# Patient Record
Sex: FEMALE | Race: WHITE | Employment: FULL TIME
[De-identification: names, ages, dates, MRNs, and addresses within clinical notes are randomized per-mention and may not be internally consistent; named-entity substitution may affect disease eponyms.]

---

## 2017-02-07 ENCOUNTER — HOSPITAL ENCOUNTER (OUTPATIENT)
Dept: MRI IMAGING | Facility: CLINIC | Age: 57
Discharge: HOME | End: 2017-02-07
Payer: COMMERCIAL

## 2017-02-07 DIAGNOSIS — M25.562 ACUTE PAIN OF LEFT KNEE: ICD-10-CM

## 2017-02-07 PROCEDURE — 73721 MRI JNT OF LWR EXTRE W/O DYE: CPT | Performed by: RADIOLOGY

## 2017-02-07 PROCEDURE — 73721 MRI JNT OF LWR EXTRE W/O DYE: CPT

## 2018-01-16 ENCOUNTER — HOSPITAL ENCOUNTER (EMERGENCY)
Facility: CLINIC | Age: 58
Discharge: HOME | End: 2018-01-16
Attending: EMERGENCY MEDICINE
Payer: COMMERCIAL

## 2018-01-16 ENCOUNTER — APPOINTMENT (OUTPATIENT)
Dept: GENERAL RADIOLOGY | Facility: CLINIC | Age: 58
End: 2018-01-16
Payer: COMMERCIAL

## 2018-01-16 VITALS
OXYGEN SATURATION: 100 % | WEIGHT: 165 LBS | SYSTOLIC BLOOD PRESSURE: 119 MMHG | BODY MASS INDEX: 22.35 KG/M2 | HEIGHT: 72 IN | TEMPERATURE: 98.7 F | DIASTOLIC BLOOD PRESSURE: 58 MMHG | RESPIRATION RATE: 18 BRPM | HEART RATE: 59 BPM

## 2018-01-16 DIAGNOSIS — J10.1 INFLUENZA B: Primary | ICD-10-CM

## 2018-01-16 LAB
DIRECT EXAM: NORMAL
Lab: NORMAL
SPECIMEN DESCRIPTION: NORMAL
STATUS: NORMAL

## 2018-01-16 PROCEDURE — 99284 EMERGENCY DEPT VISIT MOD MDM: CPT

## 2018-01-16 PROCEDURE — 87804 INFLUENZA ASSAY W/OPTIC: CPT

## 2018-01-16 PROCEDURE — 71046 X-RAY EXAM CHEST 2 VIEWS: CPT

## 2018-01-16 RX ORDER — PREDNISONE 10 MG/1
TABLET ORAL
Qty: 20 TABLET | Refills: 0 | Status: SHIPPED | OUTPATIENT
Start: 2018-01-16 | End: 2018-01-26

## 2018-01-16 RX ORDER — OSELTAMIVIR PHOSPHATE 75 MG/1
75 CAPSULE ORAL 2 TIMES DAILY
Qty: 10 CAPSULE | Refills: 0 | Status: SHIPPED | OUTPATIENT
Start: 2018-01-16 | End: 2018-01-21

## 2018-01-16 ASSESSMENT — PAIN DESCRIPTION - PROGRESSION: CLINICAL_PROGRESSION: NOT CHANGED

## 2018-01-16 ASSESSMENT — PAIN DESCRIPTION - ONSET: ONSET: ON-GOING

## 2018-01-16 ASSESSMENT — PAIN DESCRIPTION - FREQUENCY: FREQUENCY: CONTINUOUS

## 2018-01-16 ASSESSMENT — PAIN DESCRIPTION - PAIN TYPE: TYPE: ACUTE PAIN

## 2018-01-16 ASSESSMENT — PAIN SCALES - GENERAL: PAINLEVEL_OUTOF10: 7

## 2018-01-16 ASSESSMENT — PAIN DESCRIPTION - DESCRIPTORS: DESCRIPTORS: ACHING

## 2018-01-16 NOTE — ED PROVIDER NOTES
oxygen saturation is 100%. Physical Exam   Constitutional: She is oriented to person, place, and time. She appears well-developed and well-nourished. No distress. HENT:   Head: Normocephalic and atraumatic. Right Ear: External ear normal.   Left Ear: External ear normal.   Mouth/Throat: Oropharynx is clear and moist. No oropharyngeal exudate. Nasal membranes are swollen. Eyes: Conjunctivae and EOM are normal. Pupils are equal, round, and reactive to light. Neck: Normal range of motion. Neck supple. No tracheal deviation present. Cardiovascular: Normal rate, regular rhythm and intact distal pulses. Pulmonary/Chest: Effort normal and breath sounds normal. No respiratory distress. Abdominal: Soft. Bowel sounds are normal. She exhibits no distension. There is no tenderness. Musculoskeletal: Normal range of motion. She exhibits no edema or tenderness. Neurological: She is alert and oriented to person, place, and time. Skin: Skin is warm and dry. Psychiatric: She has a normal mood and affect. Her behavior is normal. Judgment and thought content normal.   Vitals reviewed. DIFFERENTIAL DIAGNOSIS/ MDM:   With a cough and fever I have ordered a chest x-ray to investigate for pneumonia. I'll also check this patient for influenza. DIAGNOSTIC RESULTS     EKG:  None    RADIOLOGY:   Xr Chest Standard (2 Vw)    Result Date: 1/16/2018  EXAMINATION: TWO VIEWS OF THE CHEST 1/16/2018 9:37 am COMPARISON: None. HISTORY: ORDERING SYSTEM PROVIDED HISTORY: cough TECHNOLOGIST PROVIDED HISTORY: Reason for exam:->cough Ordering Physician Provided Reason for Exam: cough Acuity: Acute Type of Exam: Initial FINDINGS: The heart is not enlarged. No pulmonary venous congestion or edema. Symmetric hyperexpansion of the lungs consistent with emphysema/ COPD. Apparent nodular densities overlying the 5th and 6 anterior ribs bilaterally reflect nipple shadows. No focal lung consolidation or infiltrate.  No pleural

## 2018-01-27 ENCOUNTER — APPOINTMENT (OUTPATIENT)
Dept: GENERAL RADIOLOGY | Facility: CLINIC | Age: 58
End: 2018-01-27
Payer: COMMERCIAL

## 2018-01-27 ENCOUNTER — HOSPITAL ENCOUNTER (EMERGENCY)
Facility: CLINIC | Age: 58
Discharge: HOME | End: 2018-01-27
Attending: EMERGENCY MEDICINE
Payer: COMMERCIAL

## 2018-01-27 VITALS
TEMPERATURE: 99.6 F | WEIGHT: 160 LBS | HEART RATE: 102 BPM | HEIGHT: 72 IN | DIASTOLIC BLOOD PRESSURE: 74 MMHG | BODY MASS INDEX: 21.67 KG/M2 | OXYGEN SATURATION: 93 % | RESPIRATION RATE: 16 BRPM | SYSTOLIC BLOOD PRESSURE: 131 MMHG

## 2018-01-27 DIAGNOSIS — J18.9 PNEUMONIA DUE TO ORGANISM: Primary | ICD-10-CM

## 2018-01-27 PROCEDURE — 99283 EMERGENCY DEPT VISIT LOW MDM: CPT

## 2018-01-27 PROCEDURE — 71046 X-RAY EXAM CHEST 2 VIEWS: CPT

## 2018-01-27 PROCEDURE — 96372 THER/PROPH/DIAG INJ SC/IM: CPT

## 2018-01-27 PROCEDURE — 6360000002 HC RX W HCPCS: Performed by: EMERGENCY MEDICINE

## 2018-01-27 RX ORDER — AZITHROMYCIN 250 MG/1
TABLET, FILM COATED ORAL
Qty: 6 TABLET | Refills: 0 | Status: ON HOLD | OUTPATIENT
Start: 2018-01-27 | End: 2018-02-04

## 2018-01-27 RX ORDER — CEFTRIAXONE 1 G/1
1 INJECTION, POWDER, FOR SOLUTION INTRAMUSCULAR; INTRAVENOUS ONCE
Status: COMPLETED | OUTPATIENT
Start: 2018-01-27 | End: 2018-01-27

## 2018-01-27 RX ORDER — AMOXICILLIN AND CLAVULANATE POTASSIUM 875; 125 MG/1; MG/1
1 TABLET, FILM COATED ORAL 2 TIMES DAILY
Qty: 28 TABLET | Refills: 0 | Status: SHIPPED | OUTPATIENT
Start: 2018-01-27 | End: 2018-01-27

## 2018-01-27 RX ORDER — ALBUTEROL SULFATE 2.5 MG/3ML
2.5 SOLUTION RESPIRATORY (INHALATION) ONCE
Status: COMPLETED | OUTPATIENT
Start: 2018-01-27 | End: 2018-01-27

## 2018-01-27 RX ORDER — ALBUTEROL SULFATE 90 UG/1
2 AEROSOL, METERED RESPIRATORY (INHALATION) EVERY 4 HOURS PRN
Qty: 1 INHALER | Refills: 0 | Status: SHIPPED | OUTPATIENT
Start: 2018-01-27 | End: 2019-09-15

## 2018-01-27 RX ORDER — BENZONATATE 100 MG/1
100 CAPSULE ORAL 3 TIMES DAILY PRN
Qty: 30 CAPSULE | Refills: 0 | Status: ON HOLD | OUTPATIENT
Start: 2018-01-27 | End: 2018-02-04

## 2018-01-27 RX ORDER — ALBUTEROL SULFATE 90 UG/1
2 AEROSOL, METERED RESPIRATORY (INHALATION) EVERY 6 HOURS PRN
Status: DISCONTINUED | OUTPATIENT
Start: 2018-01-27 | End: 2018-01-27

## 2018-01-27 RX ADMIN — CEFTRIAXONE SODIUM 1 G: 1 INJECTION, POWDER, FOR SOLUTION INTRAMUSCULAR; INTRAVENOUS at 16:55

## 2018-01-27 RX ADMIN — ALBUTEROL SULFATE 2.5 MG: 2.5 SOLUTION RESPIRATORY (INHALATION) at 16:24

## 2018-01-27 ASSESSMENT — PAIN DESCRIPTION - LOCATION: LOCATION: HEAD

## 2018-01-27 ASSESSMENT — PAIN DESCRIPTION - PAIN TYPE: TYPE: ACUTE PAIN

## 2018-01-27 ASSESSMENT — PAIN SCALES - GENERAL: PAINLEVEL_OUTOF10: 5

## 2018-01-27 NOTE — ED PROVIDER NOTES
history is not on file. SOCIAL HISTORY      reports that she has been smoking Cigarettes. She has been smoking about 1.00 pack per day. She has never used smokeless tobacco. She reports that she does not drink alcohol or use drugs. PHYSICAL EXAM     INITIAL VITALS:  height is 6' 3\" (1.905 m) and weight is 72.6 kg (160 lb). Her oral temperature is 99.6 °F (37.6 °C). Her blood pressure is 131/74 and her pulse is 102. Her respiration is 16 and oxygen saturation is 93%. Patient is alert and oriented, in no apparent distress. HEENT is atraumatic. Pupils are PERRL at 4 mm. Mucous membranes moist.  Clear nasal rhinorrhea. TMs negative bilaterally. Posterior pharynx and normal.   Neck is supple with no lymphadenopathy. No JVD. No meningismus. Heart sounds regular rate and rhythm with no gallops, murmurs, or rubs. Lungs:  Scant rhonchi. Moist cough. Abdomen: soft, nontender with no pain to palpation. Normal bowel sounds are noted. No rebound or guarding. Musculoskeletal exam shows no evidence of trauma. Skin: no rash or edema. Neurological exam reveals cranial nerves 2 through 12 grossly intact. Patient has equal  and normal deep tendon reflexes. Psychiatric: no hallucinations or suicidal ideation. Lymphatics.:  No lymphadenopathy.        DIFFERENTIAL DIAGNOSIS/ MDM:     Pneumonia, influenza, URI, COPD    DIAGNOSTIC RESULTS       RADIOLOGY:   I directly visualized the following  images and reviewed the radiologist interpretations:  XR CHEST STANDARD (2 VW)   Final Result   New extensive consolidation in the right lower lobe suggesting pneumonia           XR CHEST STANDARD (2 VW) (Final result)   Result time 01/27/18 16:38:09   Final result by Areli Dee MD (01/27/18 16:38:09)                Impression:    New extensive consolidation in the right lower lobe suggesting pneumonia            Narrative:    EXAMINATION:  TWO VIEWS OF THE CHEST    1/27/2018 4:27 pm    COMPARISON:  01/16/2018    HISTORY:  ORDERING SYSTEM PROVIDED HISTORY: cough  TECHNOLOGIST PROVIDED HISTORY:  Reason for exam:->cough  Ordering Physician Provided Reason for Exam: Pt. States she had the flu 2  weeks ago and was feeling better but now symptoms have returned.  C/o  headache, dizziness, cough, weakness with nausea and vomiting. Acuity: Acute  Type of Exam: Initial    FINDINGS:  New airspace opacification seen in the right lower lobe.  The heart size is  stable.  No pneumothorax.  No pleural effusion.                    EMERGENCY DEPARTMENT COURSE:   Vitals:    Vitals:    01/27/18 1610   BP: 131/74   Pulse: 102   Resp: 16   Temp: 99.6 °F (37.6 °C)   TempSrc: Oral   SpO2: 93%   Weight: 72.6 kg (160 lb)   Height: 6' 3\" (1.905 m)     -------------------------  BP: 131/74, Temp: 99.6 °F (37.6 °C), Pulse: 102, Resp: 16      Re-evaluation Notes    The patient received a breathing treatment and I gave her Rocephin while here. I have written for Zithromax and she should follow-up with her doctor. She was also written for albuterol. The patient is discharged in good condition. FINAL IMPRESSION      1. Pneumonia due to organism          DISPOSITION/PLAN   DISPOSITION Discharge - Pending Orders Complete 01/27/2018 04:45:51 PM      Condition on Disposition    good    PATIENT REFERRED TO:  Raymundo Grove MD  [address] [place] [address]  [phone]    In 1 week        DISCHARGE MEDICATIONS:  New Prescriptions    ALBUTEROL SULFATE HFA (PROVENTIL HFA) 108 (90 BASE) MCG/ACT INHALER    Inhale 2 puffs into the lungs every 4 hours as needed for Wheezing or Shortness of Breath (Space out to every 6 hours as symptoms improve) Space out to every 6 hours as symptoms improve. AZITHROMYCIN (ZITHROMAX) 250 MG TABLET    Take 2 tablets the first day, then one tablet per day, until gone.     BENZONATATE (TESSALON PERLES) 100 MG CAPSULE    Take 1 capsule by mouth 3 times daily as

## 2018-01-27 NOTE — ED NOTES
Discharge instructions reviewed, prescriptions given, and follow up information given.      Lo Gonzales RN  01/27/18 4626

## 2018-01-31 ENCOUNTER — APPOINTMENT (OUTPATIENT)
Dept: CT IMAGING | Age: 58
DRG: 194 | End: 2018-01-31
Attending: FAMILY MEDICINE
Payer: COMMERCIAL

## 2018-01-31 ENCOUNTER — HOSPITAL ENCOUNTER (INPATIENT)
Age: 58
LOS: 4 days | Discharge: HOME | DRG: 194 | End: 2018-02-04
Attending: FAMILY MEDICINE | Admitting: FAMILY MEDICINE
Payer: COMMERCIAL

## 2018-01-31 PROBLEM — J18.9 PNEUMONIA: Status: ACTIVE | Noted: 2018-01-31

## 2018-01-31 LAB
ABSOLUTE EOS #: 0 K/UL (ref 0–0.4)
ABSOLUTE IMMATURE GRANULOCYTE: ABNORMAL K/UL (ref 0–0.3)
ABSOLUTE LYMPH #: 0.7 K/UL (ref 1–4.8)
ABSOLUTE MONO #: 0.6 K/UL (ref 0.2–0.8)
ALBUMIN SERPL-MCNC: 3.1 G/DL (ref 3.5–5.2)
ALBUMIN/GLOBULIN RATIO: ABNORMAL (ref 1–2.5)
ALP BLD-CCNC: 79 U/L (ref 35–104)
ALT SERPL-CCNC: 48 U/L (ref 5–33)
ANION GAP SERPL CALCULATED.3IONS-SCNC: 13 MMOL/L (ref 9–17)
AST SERPL-CCNC: 96 U/L
BASOPHILS # BLD: 0 % (ref 0–2)
BASOPHILS ABSOLUTE: 0 K/UL (ref 0–0.2)
BILIRUB SERPL-MCNC: 0.97 MG/DL (ref 0.3–1.2)
BILIRUBIN DIRECT: 0.16 MG/DL
BILIRUBIN, INDIRECT: 0.81 MG/DL (ref 0–1)
BNP INTERPRETATION: ABNORMAL
BUN BLDV-MCNC: 13 MG/DL (ref 6–20)
CALCIUM SERPL-MCNC: 8.7 MG/DL (ref 8.6–10.4)
CHLORIDE BLD-SCNC: 95 MMOL/L (ref 98–107)
CO2: 25 MMOL/L (ref 20–31)
CREAT SERPL-MCNC: 0.52 MG/DL (ref 0.5–0.9)
DIFFERENTIAL TYPE: ABNORMAL
EKG ATRIAL RATE: 81 BPM
EKG P AXIS: 53 DEGREES
EKG P-R INTERVAL: 116 MS
EKG Q-T INTERVAL: 388 MS
EKG QRS DURATION: 90 MS
EKG QTC CALCULATION (BAZETT): 450 MS
EKG R AXIS: 74 DEGREES
EKG T AXIS: 70 DEGREES
EKG VENTRICULAR RATE: 81 BPM
EOSINOPHILS RELATIVE PERCENT: 0 % (ref 1–4)
GFR AFRICAN AMERICAN: >60 ML/MIN
GFR NON-AFRICAN AMERICAN: >60 ML/MIN
GFR SERPL CREATININE-BSD FRML MDRD: ABNORMAL ML/MIN/{1.73_M2}
GFR SERPL CREATININE-BSD FRML MDRD: ABNORMAL ML/MIN/{1.73_M2}
GLUCOSE BLD-MCNC: 121 MG/DL (ref 70–99)
HCT VFR BLD CALC: 37.6 % (ref 36–46)
HEMOGLOBIN: 12.4 G/DL (ref 12–16)
IMMATURE GRANULOCYTES: ABNORMAL %
LYMPHOCYTES # BLD: 7 % (ref 24–44)
MAGNESIUM: 1.9 MG/DL (ref 1.6–2.6)
MCH RBC QN AUTO: 30.6 PG (ref 26–34)
MCHC RBC AUTO-ENTMCNC: 33 G/DL (ref 31–37)
MCV RBC AUTO: 92.6 FL (ref 80–100)
MONOCYTES # BLD: 6 % (ref 1–7)
NRBC AUTOMATED: ABNORMAL PER 100 WBC
PDW BLD-RTO: 12.5 % (ref 11.5–14.5)
PLATELET # BLD: 272 K/UL (ref 130–400)
PLATELET ESTIMATE: ABNORMAL
PMV BLD AUTO: ABNORMAL FL (ref 6–12)
POTASSIUM SERPL-SCNC: 4.2 MMOL/L (ref 3.7–5.3)
PRO-BNP: 350 PG/ML
RBC # BLD: 4.06 M/UL (ref 4–5.2)
RBC # BLD: ABNORMAL 10*6/UL
SEG NEUTROPHILS: 87 % (ref 36–66)
SEGMENTED NEUTROPHILS ABSOLUTE COUNT: 7.8 K/UL (ref 1.8–7.7)
SODIUM BLD-SCNC: 133 MMOL/L (ref 135–144)
TOTAL PROTEIN: 6.6 G/DL (ref 6.4–8.3)
WBC # BLD: 9.1 K/UL (ref 3.5–11)
WBC # BLD: ABNORMAL 10*3/UL

## 2018-01-31 PROCEDURE — 82248 BILIRUBIN DIRECT: CPT

## 2018-01-31 PROCEDURE — 93005 ELECTROCARDIOGRAM TRACING: CPT

## 2018-01-31 PROCEDURE — 6360000002 HC RX W HCPCS: Performed by: FAMILY MEDICINE

## 2018-01-31 PROCEDURE — 85025 COMPLETE CBC W/AUTO DIFF WBC: CPT

## 2018-01-31 PROCEDURE — 2060000000 HC ICU INTERMEDIATE R&B

## 2018-01-31 PROCEDURE — 80053 COMPREHEN METABOLIC PANEL: CPT

## 2018-01-31 PROCEDURE — 36415 COLL VENOUS BLD VENIPUNCTURE: CPT

## 2018-01-31 PROCEDURE — 2580000003 HC RX 258: Performed by: FAMILY MEDICINE

## 2018-01-31 PROCEDURE — 87040 BLOOD CULTURE FOR BACTERIA: CPT

## 2018-01-31 PROCEDURE — 83880 ASSAY OF NATRIURETIC PEPTIDE: CPT

## 2018-01-31 PROCEDURE — 94640 AIRWAY INHALATION TREATMENT: CPT

## 2018-01-31 PROCEDURE — 6360000004 HC RX CONTRAST MEDICATION: Performed by: FAMILY MEDICINE

## 2018-01-31 PROCEDURE — 71260 CT THORAX DX C+: CPT

## 2018-01-31 PROCEDURE — 83735 ASSAY OF MAGNESIUM: CPT

## 2018-01-31 RX ORDER — METHYLPREDNISOLONE SODIUM SUCCINATE 125 MG/2ML
125 INJECTION, POWDER, LYOPHILIZED, FOR SOLUTION INTRAMUSCULAR; INTRAVENOUS ONCE
Status: COMPLETED | OUTPATIENT
Start: 2018-01-31 | End: 2018-01-31

## 2018-01-31 RX ORDER — ALBUTEROL SULFATE 2.5 MG/3ML
2.5 SOLUTION RESPIRATORY (INHALATION) EVERY 4 HOURS
Status: DISCONTINUED | OUTPATIENT
Start: 2018-01-31 | End: 2018-02-03

## 2018-01-31 RX ORDER — SODIUM CHLORIDE 0.9 % (FLUSH) 0.9 %
10 SYRINGE (ML) INJECTION PRN
Status: DISCONTINUED | OUTPATIENT
Start: 2018-01-31 | End: 2018-02-04 | Stop reason: HOSPADM

## 2018-01-31 RX ORDER — DEXTROSE AND SODIUM CHLORIDE 5; .45 G/100ML; G/100ML
INJECTION, SOLUTION INTRAVENOUS CONTINUOUS
Status: DISCONTINUED | OUTPATIENT
Start: 2018-01-31 | End: 2018-02-02

## 2018-01-31 RX ORDER — LEVOFLOXACIN 5 MG/ML
750 INJECTION, SOLUTION INTRAVENOUS EVERY 24 HOURS
Status: DISCONTINUED | OUTPATIENT
Start: 2018-01-31 | End: 2018-02-03

## 2018-01-31 RX ORDER — 0.9 % SODIUM CHLORIDE 0.9 %
50 INTRAVENOUS SOLUTION INTRAVENOUS ONCE
Status: COMPLETED | OUTPATIENT
Start: 2018-01-31 | End: 2018-01-31

## 2018-01-31 RX ORDER — MAGNESIUM SULFATE 1 G/100ML
1 INJECTION INTRAVENOUS PRN
Status: DISCONTINUED | OUTPATIENT
Start: 2018-01-31 | End: 2018-02-04 | Stop reason: HOSPADM

## 2018-01-31 RX ORDER — METHYLPREDNISOLONE SODIUM SUCCINATE 125 MG/2ML
60 INJECTION, POWDER, LYOPHILIZED, FOR SOLUTION INTRAMUSCULAR; INTRAVENOUS EVERY 8 HOURS
Status: DISCONTINUED | OUTPATIENT
Start: 2018-02-01 | End: 2018-02-02

## 2018-01-31 RX ORDER — NICOTINE 21 MG/24HR
1 PATCH, TRANSDERMAL 24 HOURS TRANSDERMAL DAILY PRN
Status: DISCONTINUED | OUTPATIENT
Start: 2018-01-31 | End: 2018-02-04 | Stop reason: HOSPADM

## 2018-01-31 RX ADMIN — ALBUTEROL SULFATE 2.5 MG: 2.5 SOLUTION RESPIRATORY (INHALATION) at 23:46

## 2018-01-31 RX ADMIN — Medication 10 ML: at 18:53

## 2018-01-31 RX ADMIN — LEVOFLOXACIN 750 MG: 5 INJECTION, SOLUTION INTRAVENOUS at 18:44

## 2018-01-31 RX ADMIN — IOPAMIDOL 80 ML: 755 INJECTION, SOLUTION INTRAVENOUS at 18:53

## 2018-01-31 RX ADMIN — METHYLPREDNISOLONE SODIUM SUCCINATE 125 MG: 125 INJECTION, POWDER, FOR SOLUTION INTRAMUSCULAR; INTRAVENOUS at 20:21

## 2018-01-31 RX ADMIN — DEXTROSE AND SODIUM CHLORIDE: 5; 450 INJECTION, SOLUTION INTRAVENOUS at 17:39

## 2018-01-31 RX ADMIN — SODIUM CHLORIDE 50 ML: 9 INJECTION, SOLUTION INTRAVENOUS at 18:53

## 2018-01-31 RX ADMIN — ALBUTEROL SULFATE 2.5 MG: 2.5 SOLUTION RESPIRATORY (INHALATION) at 19:22

## 2018-01-31 ASSESSMENT — PAIN SCALES - GENERAL: PAINLEVEL_OUTOF10: 0

## 2018-01-31 NOTE — PROGRESS NOTES
[facility] Pneumonia [J18.9] . Vitals:    01/31/18 1653   BP: (!) 132/51   Pulse: 83   Resp: 16   Temp: 98.2 °F (36.8 °C)   SpO2: 94%   . Patients home meds are   Prior to Admission medications    Medication Sig Start Date End Date Taking? Authorizing Provider   albuterol sulfate HFA (PROVENTIL HFA) 108 (90 Base) MCG/ACT inhaler Inhale 2 puffs into the lungs every 4 hours as needed for Wheezing or Shortness of Breath (Space out to every 6 hours as symptoms improve) Space out to every 6 hours as symptoms improve. 1/27/18   Dano Lewis MD   benzonatate (TESSALON PERLES) 100 MG capsule Take 1 capsule by mouth 3 times daily as needed for Cough 1/27/18 2/3/18  Dano Lewis MD   azithromycin (ZITHROMAX) 250 MG tablet Take 2 tablets the first day, then one tablet per day, until gone.  1/27/18   Dano Lewis MD   ibuprofen (ADVIL;MOTRIN) 800 MG tablet Take 1 tablet by mouth every 8 hours as needed for Pain 12/17/16   Justina Anaya MD   .  Recent Surgical History: None = 0     Assessment     FEV1/FVC    FEV1 Predicted       FEV1 1.4    FEV1 % Predicted 38%  FVC   IS volume   IBW   FIO2% RA  SPO2 94%  RR 22  Breath Sounds: rhonchi/wheeze/crackles      · Bronchodilator assessment at level  4  · Hyperinflation assessment at level   · Secretion Management assessment at level    ·   · []    Bronchodilator Assessment  BRONCHODILATOR ASSESSMENT SCORE  Score 0 1 2 3 4 5   Breath Sounds   []  Patient Baseline []  No Wheeze good aeration []  Faint, scattered wheezing, good aeration []  Expiratory Wheezing and or moderately diminished [x]  Insp/Exp wheeze and/or very diminished []  Insp/Exp and/ or marked distress   Respiratory Rate   []  Patient Baseline []  Less than 20 []  Less than 20 [x]  20-25 []  Greater than 25 []  Greater than 25   Peak flow % of Pred or PB []  NA   []  Greater than 90%  []  81-90% []  71-80% []  Less than or equal to 70%  or unable to perform

## 2018-02-01 LAB
-: ABNORMAL
ABSOLUTE EOS #: 0 K/UL (ref 0–0.4)
ABSOLUTE IMMATURE GRANULOCYTE: ABNORMAL K/UL (ref 0–0.3)
ABSOLUTE LYMPH #: 0.3 K/UL (ref 1–4.8)
ABSOLUTE MONO #: 0.1 K/UL (ref 0.2–0.8)
ALBUMIN SERPL-MCNC: 2.7 G/DL (ref 3.5–5.2)
ALBUMIN/GLOBULIN RATIO: ABNORMAL (ref 1–2.5)
ALP BLD-CCNC: 72 U/L (ref 35–104)
ALT SERPL-CCNC: 38 U/L (ref 5–33)
AMORPHOUS: ABNORMAL
ANION GAP SERPL CALCULATED.3IONS-SCNC: 15 MMOL/L (ref 9–17)
AST SERPL-CCNC: 50 U/L
BACTERIA: ABNORMAL
BASOPHILS # BLD: 0 % (ref 0–2)
BASOPHILS ABSOLUTE: 0 K/UL (ref 0–0.2)
BILIRUB SERPL-MCNC: 0.39 MG/DL (ref 0.3–1.2)
BILIRUBIN DIRECT: 0.1 MG/DL
BILIRUBIN URINE: NEGATIVE
BILIRUBIN, INDIRECT: 0.29 MG/DL (ref 0–1)
BUN BLDV-MCNC: 10 MG/DL (ref 6–20)
CALCIUM SERPL-MCNC: 8.7 MG/DL (ref 8.6–10.4)
CASTS UA: ABNORMAL /LPF
CHLORIDE BLD-SCNC: 96 MMOL/L (ref 98–107)
CO2: 22 MMOL/L (ref 20–31)
COLOR: ABNORMAL
COMMENT UA: ABNORMAL
CREAT SERPL-MCNC: <0.4 MG/DL (ref 0.5–0.9)
CRYSTALS, UA: ABNORMAL /HPF
DIFFERENTIAL TYPE: ABNORMAL
EOSINOPHILS RELATIVE PERCENT: 0 % (ref 1–4)
EPITHELIAL CELLS UA: ABNORMAL /HPF (ref 0–5)
GFR AFRICAN AMERICAN: ABNORMAL ML/MIN
GFR NON-AFRICAN AMERICAN: ABNORMAL ML/MIN
GFR SERPL CREATININE-BSD FRML MDRD: ABNORMAL ML/MIN/{1.73_M2}
GFR SERPL CREATININE-BSD FRML MDRD: ABNORMAL ML/MIN/{1.73_M2}
GLUCOSE BLD-MCNC: 199 MG/DL (ref 70–99)
GLUCOSE URINE: ABNORMAL
HCT VFR BLD CALC: 35.8 % (ref 36–46)
HEMOGLOBIN: 11.7 G/DL (ref 12–16)
IMMATURE GRANULOCYTES: ABNORMAL %
KETONES, URINE: NEGATIVE
LEUKOCYTE ESTERASE, URINE: NEGATIVE
LV EF: 65 %
LVEF MODALITY: NORMAL
LYMPHOCYTES # BLD: 5 % (ref 24–44)
MAGNESIUM: 2.1 MG/DL (ref 1.6–2.6)
MCH RBC QN AUTO: 30.6 PG (ref 26–34)
MCHC RBC AUTO-ENTMCNC: 32.7 G/DL (ref 31–37)
MCV RBC AUTO: 93.4 FL (ref 80–100)
MONOCYTES # BLD: 1 % (ref 1–7)
MUCUS: ABNORMAL
NITRITE, URINE: NEGATIVE
NRBC AUTOMATED: ABNORMAL PER 100 WBC
OTHER OBSERVATIONS UA: ABNORMAL
PDW BLD-RTO: 12.3 % (ref 11.5–14.5)
PH UA: 5.5 (ref 5–8)
PLATELET # BLD: 263 K/UL (ref 130–400)
PLATELET ESTIMATE: ABNORMAL
PMV BLD AUTO: ABNORMAL FL (ref 6–12)
POTASSIUM SERPL-SCNC: 4.1 MMOL/L (ref 3.7–5.3)
PROTEIN UA: ABNORMAL
RBC # BLD: 3.83 M/UL (ref 4–5.2)
RBC # BLD: ABNORMAL 10*6/UL
RBC UA: ABNORMAL /HPF (ref 0–2)
RENAL EPITHELIAL, UA: ABNORMAL /HPF
SEG NEUTROPHILS: 94 % (ref 36–66)
SEGMENTED NEUTROPHILS ABSOLUTE COUNT: 5.9 K/UL (ref 1.8–7.7)
SODIUM BLD-SCNC: 133 MMOL/L (ref 135–144)
SPECIFIC GRAVITY UA: 1.01 (ref 1–1.03)
TOTAL PROTEIN: 6.5 G/DL (ref 6.4–8.3)
TRICHOMONAS: ABNORMAL
TURBIDITY: CLEAR
URINE HGB: ABNORMAL
UROBILINOGEN, URINE: NORMAL
WBC # BLD: 6.3 K/UL (ref 3.5–11)
WBC # BLD: ABNORMAL 10*3/UL
WBC UA: ABNORMAL /HPF (ref 0–5)
YEAST: ABNORMAL

## 2018-02-01 PROCEDURE — 36415 COLL VENOUS BLD VENIPUNCTURE: CPT

## 2018-02-01 PROCEDURE — 6370000000 HC RX 637 (ALT 250 FOR IP): Performed by: INTERNAL MEDICINE

## 2018-02-01 PROCEDURE — 6360000002 HC RX W HCPCS: Performed by: FAMILY MEDICINE

## 2018-02-01 PROCEDURE — 82248 BILIRUBIN DIRECT: CPT

## 2018-02-01 PROCEDURE — 2060000000 HC ICU INTERMEDIATE R&B

## 2018-02-01 PROCEDURE — 2580000003 HC RX 258: Performed by: FAMILY MEDICINE

## 2018-02-01 PROCEDURE — 81001 URINALYSIS AUTO W/SCOPE: CPT

## 2018-02-01 PROCEDURE — 85025 COMPLETE CBC W/AUTO DIFF WBC: CPT

## 2018-02-01 PROCEDURE — 2700000000 HC OXYGEN THERAPY PER DAY

## 2018-02-01 PROCEDURE — 83735 ASSAY OF MAGNESIUM: CPT

## 2018-02-01 PROCEDURE — 93306 TTE W/DOPPLER COMPLETE: CPT

## 2018-02-01 PROCEDURE — 94640 AIRWAY INHALATION TREATMENT: CPT

## 2018-02-01 PROCEDURE — 80053 COMPREHEN METABOLIC PANEL: CPT

## 2018-02-01 PROCEDURE — 87086 URINE CULTURE/COLONY COUNT: CPT

## 2018-02-01 RX ADMIN — ALBUTEROL SULFATE 2.5 MG: 2.5 SOLUTION RESPIRATORY (INHALATION) at 15:55

## 2018-02-01 RX ADMIN — METHYLPREDNISOLONE SODIUM SUCCINATE 60 MG: 125 INJECTION, POWDER, FOR SOLUTION INTRAMUSCULAR; INTRAVENOUS at 12:31

## 2018-02-01 RX ADMIN — ALBUTEROL SULFATE 2.5 MG: 2.5 SOLUTION RESPIRATORY (INHALATION) at 08:04

## 2018-02-01 RX ADMIN — ALBUTEROL SULFATE 2.5 MG: 2.5 SOLUTION RESPIRATORY (INHALATION) at 03:56

## 2018-02-01 RX ADMIN — DEXTROSE AND SODIUM CHLORIDE: 5; 450 INJECTION, SOLUTION INTRAVENOUS at 08:43

## 2018-02-01 RX ADMIN — LEVOFLOXACIN 750 MG: 5 INJECTION, SOLUTION INTRAVENOUS at 17:56

## 2018-02-01 RX ADMIN — ALBUTEROL SULFATE 2.5 MG: 2.5 SOLUTION RESPIRATORY (INHALATION) at 23:05

## 2018-02-01 RX ADMIN — METHYLPREDNISOLONE SODIUM SUCCINATE 60 MG: 125 INJECTION, POWDER, FOR SOLUTION INTRAMUSCULAR; INTRAVENOUS at 19:34

## 2018-02-01 RX ADMIN — ALBUTEROL SULFATE 2.5 MG: 2.5 SOLUTION RESPIRATORY (INHALATION) at 19:22

## 2018-02-01 RX ADMIN — ALBUTEROL SULFATE 2.5 MG: 2.5 SOLUTION RESPIRATORY (INHALATION) at 11:46

## 2018-02-01 RX ADMIN — METOPROLOL TARTRATE 25 MG: 25 TABLET ORAL at 19:33

## 2018-02-01 RX ADMIN — METHYLPREDNISOLONE SODIUM SUCCINATE 60 MG: 125 INJECTION, POWDER, FOR SOLUTION INTRAMUSCULAR; INTRAVENOUS at 04:25

## 2018-02-01 ASSESSMENT — PAIN SCALES - GENERAL: PAINLEVEL_OUTOF10: 0

## 2018-02-01 NOTE — CARE COORDINATION
Case Management Initial Discharge Plan  Pavan Rai,         Readmission Risk              Readmission Risk:        5.25       Age 71 or Greater:  0    Admitted from SNF or Requires Paid or Family Care:  0    Currently has CHF,COPD,ARF,CRI,or is on dialysis:  4    Takes more than 5 Prescription Medications:  0    Takes Digoxin,Insulin,Anticoagulants,Narcotics or ASA/Plavix:  [address] in Past 12 Months:  0    On Disability:  0    Patient Considers own Health:  1.25            Met with:patient to discuss discharge plans. Information verified: address, contacts, phone number, , insurance Yes  PCP: Kaci Shine MD  Date of last visit: yesterday    Insurance Provider: Daniel Hernandez 150    Discharge Planning  Current Residence:  Private home  Living Arrangements:  Family Members   Home has 1 stories/1 stairs to climb  Support Systems:  Family Members     Current Services PTA:  None   Agency: none     Patient able to perform ADL's:Independent  DME in home:  None   DME used to aid ambulation prior to admission:   na  DME used during admission:  Oxygen     Potential Assistance Needed:  N/A    Pharmacy: [facility] on [address] Medications:  Yes  Does patient want to participate in local refill/ meds to beds program?  N/A    Patient agreeable to home care: No  Lee of choice provided:  n/a      Type of Home Care Services:  None  Patient expects to be discharged to:  home    Prior SNF/Rehab Placement and Facility: none   Agreeable to SNF/Rehab: No  Lee of choice provided: n/a   Evaluation: n/a    Expected Discharge date:  18  Follow Up Appointment: Best Day/ Time: Tuesday AM    Transportation provider: per family   Transportation arrangements needed for discharge: No    Discharge Plan:   Patient lives with spouse in one level home. Has not been to doctor in 6 years. Admitted for pna and flu. On IV atb and 02. Will need to monitor for home 02 and atb at [place].

## 2018-02-01 NOTE — PROGRESS NOTES
Pt refused flu vaccine today and would like to get it in Dr. Pastor Novoa office when she follows up

## 2018-02-01 NOTE — H&P
children: N/A    Years of education: N/A     Occupational History    Not on file. Social History Main Topics    Smoking status: Current Every Day Smoker     Packs/day: 1.00     Types: Cigarettes    Smokeless tobacco: Never Used    Alcohol use No    Drug use: No    Sexual activity: Not on file     Other Topics Concern    Not on file     Social History Narrative    No narrative on file       Vitals:  BP (!) 115/58   Pulse 75   Temp 97.7 °F (36.5 °C) (Oral)   Resp 16   Ht 6' 3.2\" (1.91 m)   Wt 160 lb 0.9 oz (72.6 kg)   SpO2 95%   BMI 19.90 kg/m²   Temp (24hrs), Av °F (36.7 °C), Min:97.7 °F (36.5 °C), Max:98.2 °F (36.8 °C)    No results for input(s): POCGLU in the last 72 hours. I/O (24Hr): Intake/Output Summary (Last 24 hours) at 18 1226  Last data filed at 18 0501   Gross per 24 hour   Intake              786 ml   Output              700 ml   Net               86 ml       Labs:  Reviewed, ct scan reviewed     Physical Examination:        General appearance: alert, cooperative and looked fatigued, exhausted and pale  Mental Status: oriented to person, place and time and normal affect  Lungs: ronchi and rales on the rt yesterday , CTA today   Heart: regular rate and rhythm, no murmur,  Abdomen: soft, nontender, nondistended, bowel sounds present all four quadrants, no masses, hepatomegaly or splenomegaly  Extremities: no edema, redness or tenderness in the calves  Skin: no gross lesions, rashes, or induration    Assessment:        Primary Problem  <principal problem not specified>     Active Hospital Problems    Diagnosis Date Noted    Pneumonia [J18.9] 2018     No past medical history on file. factor v leiden carrier    Plan:        1. RT. Sided  Multilobar Pneumonia  On IV  Antibiotics , steroids and aerosol treatments and O2 per NC   2. Acute exacerbation of COPD as above  3.  Influenza B  Diagnosed and treated by urgent care although treatment was started on 5th day of

## 2018-02-01 NOTE — CONSULTS
History:    Social History     Social History    Marital status: Single     Spouse name: N/A    Number of children: N/A    Years of education: N/A     Occupational History    Not on file. Social History Main Topics    Smoking status: Current Every Day Smoker     Packs/day: 1.00     Types: Cigarettes    Smokeless tobacco: Never Used    Alcohol use No    Drug use: No    Sexual activity: Not on file     Other Topics Concern    Not on file     Social History Narrative    No narrative on file     Family History:   No family history on file. · REVIEW OF SYSTEMS   Pertinent information as above. PHYSICAL EXAM:    Vitals:    VITALS:  BP (!) 115/58   Pulse 75   Temp 97.7 °F (36.5 °C) (Oral)   Resp 16   Ht 6' 3.2\" (1.91 m)   Wt 160 lb 0.9 oz (72.6 kg)   SpO2 95%   BMI 19.90 kg/m²   24HR INTAKE/OUTPUT:    Intake/Output Summary (Last 24 hours) at 02/01/18 1455  Last data filed at 02/01/18 0501   Gross per 24 hour   Intake              786 ml   Output              700 ml   Net               86 ml       CONSTITUTIONAL:  awake, alert, cooperative, no apparent distress, and appears stated age  EYES: Pupils equal, round and reactive to light, extra ocular muscles intact, sclera clear, conjunctiva normal  ENT:  normocepalic, without obvious abnormality  NECK:  supple, symmetrical, trachea midline, no carotid bruit ,   No  JVD  BACK:  symmetric  LUNGS: Non-labored, good air exchange, clear to auscultation bilaterally, no crackles or wheezing  CARDIOVASCULAR:  Normal apical impulse, regular rate and rhythm, normal S1 and S2, no S3 or S4, and no murmur noted, no rub. ABDOMEN:  No scars, normal bowel sounds, soft, non-distended, non-tender, no masses palpated, no hepatosplenomegally, no bruit. MUSCULOSKELETAL:  there is no redness, warmth, or swelling of the joints   No leg edema. NEUROLOGIC:  Awake, alert, oriented to name, place and time.   SKIN:  no bruising or bleeding, normal skin color, texture, turgor and no jaundice    DATA:   ECG:  Date:  Yesterday. Sinus rhythm with frequent PVCs in singles. Stress Test:  Not performed to date  Angiography:  Not performed to date    Cardiology Labs:No results for input(s): MYOGLOBIN, CKTOTAL, CKMB, CKMBINDEX, TROPONINT, BNP in the last 72 hours. Warfarin PT/INR:No results found for: PROTIME, INR, WARFARIN  CBC:  Lab Results   Component Value Date    WBC 6.3 02/01/2018    RBC 3.83 02/01/2018    HGB 11.7 02/01/2018    HCT 35.8 02/01/2018    MCV 93.4 02/01/2018    MCH 30.6 02/01/2018    MCHC 32.7 02/01/2018    RDW 12.3 02/01/2018     02/01/2018    MPV NOT REPORTED 02/01/2018     CMP:  Lab Results   Component Value Date     02/01/2018    K 4.1 02/01/2018    CL 96 02/01/2018    CO2 22 02/01/2018    BUN 10 02/01/2018    CREATININE <0.40 02/01/2018    GFRAA CANNOT BE CALCULATED 02/01/2018    LABGLOM CANNOT BE CALCULATED 02/01/2018    GLUCOSE 199 02/01/2018    CALCIUM 8.7 02/01/2018     Magnesium:    Lab Results   Component Value Date    MG 2.1 02/01/2018     PTT:  No results found for: APTT, PTT  TSH:  No results found for: TSH  BMP:  Lab Results   Component Value Date     02/01/2018    K 4.1 02/01/2018    CL 96 02/01/2018    CO2 22 02/01/2018    BUN 10 02/01/2018    LABALBU 2.7 02/01/2018    CREATININE <0.40 02/01/2018    CALCIUM 8.7 02/01/2018    GFRAA CANNOT BE CALCULATED 02/01/2018    LABGLOM CANNOT BE CALCULATED 02/01/2018    GLUCOSE 199 02/01/2018     LIVER PROFILE:  Recent Labs      01/31/18   1757  02/01/18   0703   AST  96*  50*   ALT  48*  38*   LABALBU  3.1*  2.7*   ALKPHOS  79  72   BILITOT  0.97  0.39   BILIDIR  0.16  0.10   IBILI  0.81  0.29   PROT  6.6  6.5   ALBUMIN  NOT REPORTED  NOT REPORTED     FLP:  No results found for: CHOL, TRIG, HDL, LDLCHOLESTEROL          IMPRESSION  #1 asymptomatic ventricular arrhythmia including nonsustained ventricular tachycardia and PVCs in singles  #2 probable COPD  #3 right lung pneumonia.   #4 weight loss    Patient Active Problem List   Diagnosis    Pneumonia           RECOMMENDATIONS:     Continue current medications  Management plan was discussed with patient     Case discussed with the patient and her  who was at the bedside. Also discussed with the patient's nurse. Her magnesium potassium are normal.  No evidence for acute coronary syndrome. my recommendation is to add Lopressor 25 mg by mouth twice a day. We'll obtain an echocardiogram to assess for any structural cardiac disease. My concern is the significant weight loss and I will leave it to Dr. Canales Area discretion for further workup. After her acute problem improves, she would benefit from further cardiac workup including stress testing. Thank you for consultation and I will follow with you.       Electronically signed by Amari Rodriguez MD on 2/1/2018 at 2:55 PM     CC: Tejal Henry MD

## 2018-02-02 ENCOUNTER — APPOINTMENT (OUTPATIENT)
Dept: GENERAL RADIOLOGY | Age: 58
DRG: 194 | End: 2018-02-02
Attending: FAMILY MEDICINE
Payer: COMMERCIAL

## 2018-02-02 PROBLEM — R73.9 STEROID-INDUCED HYPERGLYCEMIA: Status: ACTIVE | Noted: 2018-02-02

## 2018-02-02 PROBLEM — D64.9 ANEMIA: Status: ACTIVE | Noted: 2018-02-02

## 2018-02-02 PROBLEM — R74.8 LIVER ENZYME ELEVATION: Status: ACTIVE | Noted: 2018-02-02

## 2018-02-02 PROBLEM — I49.3 PVC (PREMATURE VENTRICULAR CONTRACTION): Status: ACTIVE | Noted: 2018-02-02

## 2018-02-02 PROBLEM — J44.1 CHRONIC OBSTRUCTIVE PULMONARY DISEASE WITH ACUTE EXACERBATION (HCC): Chronic | Status: ACTIVE | Noted: 2018-02-02

## 2018-02-02 PROBLEM — T38.0X5A STEROID-INDUCED HYPERGLYCEMIA: Status: ACTIVE | Noted: 2018-02-02

## 2018-02-02 LAB
ABSOLUTE EOS #: 0 K/UL (ref 0–0.4)
ABSOLUTE IMMATURE GRANULOCYTE: ABNORMAL K/UL (ref 0–0.3)
ABSOLUTE LYMPH #: 0.4 K/UL (ref 1–4.8)
ABSOLUTE MONO #: 0.5 K/UL (ref 0.2–0.8)
ALBUMIN SERPL-MCNC: 2.7 G/DL (ref 3.5–5.2)
ALBUMIN/GLOBULIN RATIO: ABNORMAL (ref 1–2.5)
ALP BLD-CCNC: 70 U/L (ref 35–104)
ALT SERPL-CCNC: 35 U/L (ref 5–33)
ANION GAP SERPL CALCULATED.3IONS-SCNC: 11 MMOL/L (ref 9–17)
AST SERPL-CCNC: 43 U/L
BASOPHILS # BLD: 0 % (ref 0–2)
BASOPHILS ABSOLUTE: 0 K/UL (ref 0–0.2)
BILIRUB SERPL-MCNC: 0.3 MG/DL (ref 0.3–1.2)
BUN BLDV-MCNC: 12 MG/DL (ref 6–20)
BUN/CREAT BLD: 30 (ref 9–20)
CALCIUM SERPL-MCNC: 8.8 MG/DL (ref 8.6–10.4)
CHLORIDE BLD-SCNC: 99 MMOL/L (ref 98–107)
CO2: 24 MMOL/L (ref 20–31)
CREAT SERPL-MCNC: 0.4 MG/DL (ref 0.5–0.9)
CULTURE: NO GROWTH
CULTURE: NORMAL
DIFFERENTIAL TYPE: ABNORMAL
EOSINOPHILS RELATIVE PERCENT: 0 % (ref 1–4)
FERRITIN: 1860 UG/L (ref 13–150)
FOLATE: 6.1 NG/ML
GFR AFRICAN AMERICAN: >60 ML/MIN
GFR NON-AFRICAN AMERICAN: >60 ML/MIN
GFR SERPL CREATININE-BSD FRML MDRD: ABNORMAL ML/MIN/{1.73_M2}
GFR SERPL CREATININE-BSD FRML MDRD: ABNORMAL ML/MIN/{1.73_M2}
GLUCOSE BLD-MCNC: 132 MG/DL (ref 65–105)
GLUCOSE BLD-MCNC: 167 MG/DL (ref 65–105)
GLUCOSE BLD-MCNC: 178 MG/DL (ref 70–99)
HAV IGM SER IA-ACNC: NONREACTIVE
HCT VFR BLD CALC: 34.8 % (ref 36–46)
HEMOGLOBIN: 11.6 G/DL (ref 12–16)
HEPATITIS B CORE IGM ANTIBODY: NONREACTIVE
HEPATITIS B SURFACE ANTIGEN: NONREACTIVE
HEPATITIS C ANTIBODY: NONREACTIVE
IMMATURE GRANULOCYTES: ABNORMAL %
IRON SATURATION: 33 % (ref 20–55)
IRON: 61 UG/DL (ref 37–145)
LYMPHOCYTES # BLD: 3 % (ref 24–44)
Lab: NORMAL
MCH RBC QN AUTO: 31.7 PG (ref 26–34)
MCHC RBC AUTO-ENTMCNC: 33.4 G/DL (ref 31–37)
MCV RBC AUTO: 94.9 FL (ref 80–100)
MONOCYTES # BLD: 3 % (ref 1–7)
NRBC AUTOMATED: ABNORMAL PER 100 WBC
PDW BLD-RTO: 13.1 % (ref 11.5–14.5)
PLATELET # BLD: 315 K/UL (ref 130–400)
PLATELET ESTIMATE: ABNORMAL
PMV BLD AUTO: 8.8 FL (ref 6–12)
POTASSIUM SERPL-SCNC: 4.1 MMOL/L (ref 3.7–5.3)
RBC # BLD: 3.67 M/UL (ref 4–5.2)
RBC # BLD: ABNORMAL 10*6/UL
SEG NEUTROPHILS: 94 % (ref 36–66)
SEGMENTED NEUTROPHILS ABSOLUTE COUNT: 12.9 K/UL (ref 1.8–7.7)
SODIUM BLD-SCNC: 134 MMOL/L (ref 135–144)
SPECIMEN DESCRIPTION: NORMAL
SPECIMEN DESCRIPTION: NORMAL
STATUS: NORMAL
TOTAL IRON BINDING CAPACITY: 183 UG/DL (ref 250–450)
TOTAL PROTEIN: 5.9 G/DL (ref 6.4–8.3)
UNSATURATED IRON BINDING CAPACITY: 122 UG/DL (ref 112–347)
VITAMIN B-12: 559 PG/ML (ref 232–1245)
WBC # BLD: 13.8 K/UL (ref 3.5–11)
WBC # BLD: ABNORMAL 10*3/UL

## 2018-02-02 PROCEDURE — 84165 PROTEIN E-PHORESIS SERUM: CPT

## 2018-02-02 PROCEDURE — 86335 IMMUNFIX E-PHORSIS/URINE/CSF: CPT

## 2018-02-02 PROCEDURE — 84155 ASSAY OF PROTEIN SERUM: CPT

## 2018-02-02 PROCEDURE — 82947 ASSAY GLUCOSE BLOOD QUANT: CPT

## 2018-02-02 PROCEDURE — 86334 IMMUNOFIX E-PHORESIS SERUM: CPT

## 2018-02-02 PROCEDURE — 94640 AIRWAY INHALATION TREATMENT: CPT

## 2018-02-02 PROCEDURE — 85025 COMPLETE CBC W/AUTO DIFF WBC: CPT

## 2018-02-02 PROCEDURE — 2060000000 HC ICU INTERMEDIATE R&B

## 2018-02-02 PROCEDURE — 83550 IRON BINDING TEST: CPT

## 2018-02-02 PROCEDURE — 82607 VITAMIN B-12: CPT

## 2018-02-02 PROCEDURE — 84156 ASSAY OF PROTEIN URINE: CPT

## 2018-02-02 PROCEDURE — 71046 X-RAY EXAM CHEST 2 VIEWS: CPT

## 2018-02-02 PROCEDURE — 83540 ASSAY OF IRON: CPT

## 2018-02-02 PROCEDURE — 6360000002 HC RX W HCPCS: Performed by: FAMILY MEDICINE

## 2018-02-02 PROCEDURE — 80074 ACUTE HEPATITIS PANEL: CPT

## 2018-02-02 PROCEDURE — 84166 PROTEIN E-PHORESIS/URINE/CSF: CPT

## 2018-02-02 PROCEDURE — 80053 COMPREHEN METABOLIC PANEL: CPT

## 2018-02-02 PROCEDURE — 6370000000 HC RX 637 (ALT 250 FOR IP): Performed by: FAMILY MEDICINE

## 2018-02-02 PROCEDURE — 2700000000 HC OXYGEN THERAPY PER DAY

## 2018-02-02 PROCEDURE — 82746 ASSAY OF FOLIC ACID SERUM: CPT

## 2018-02-02 PROCEDURE — 36415 COLL VENOUS BLD VENIPUNCTURE: CPT

## 2018-02-02 PROCEDURE — 82728 ASSAY OF FERRITIN: CPT

## 2018-02-02 PROCEDURE — 6370000000 HC RX 637 (ALT 250 FOR IP): Performed by: INTERNAL MEDICINE

## 2018-02-02 PROCEDURE — 83516 IMMUNOASSAY NONANTIBODY: CPT

## 2018-02-02 PROCEDURE — 94760 N-INVAS EAR/PLS OXIMETRY 1: CPT

## 2018-02-02 RX ORDER — METHYLPREDNISOLONE SODIUM SUCCINATE 40 MG/ML
40 INJECTION, POWDER, LYOPHILIZED, FOR SOLUTION INTRAMUSCULAR; INTRAVENOUS EVERY 8 HOURS
Status: DISCONTINUED | OUTPATIENT
Start: 2018-02-02 | End: 2018-02-03

## 2018-02-02 RX ORDER — NICOTINE POLACRILEX 4 MG
15 LOZENGE BUCCAL PRN
Status: DISCONTINUED | OUTPATIENT
Start: 2018-02-02 | End: 2018-02-04 | Stop reason: HOSPADM

## 2018-02-02 RX ORDER — DEXTROSE MONOHYDRATE 25 G/50ML
12.5 INJECTION, SOLUTION INTRAVENOUS PRN
Status: DISCONTINUED | OUTPATIENT
Start: 2018-02-02 | End: 2018-02-04 | Stop reason: HOSPADM

## 2018-02-02 RX ORDER — DEXTROSE MONOHYDRATE 50 MG/ML
100 INJECTION, SOLUTION INTRAVENOUS PRN
Status: DISCONTINUED | OUTPATIENT
Start: 2018-02-02 | End: 2018-02-04 | Stop reason: HOSPADM

## 2018-02-02 RX ADMIN — ALBUTEROL SULFATE 2.5 MG: 2.5 SOLUTION RESPIRATORY (INHALATION) at 19:40

## 2018-02-02 RX ADMIN — METHYLPREDNISOLONE SODIUM SUCCINATE 40 MG: 125 INJECTION, POWDER, FOR SOLUTION INTRAMUSCULAR; INTRAVENOUS at 21:02

## 2018-02-02 RX ADMIN — METOPROLOL TARTRATE 25 MG: 25 TABLET ORAL at 08:44

## 2018-02-02 RX ADMIN — METHYLPREDNISOLONE SODIUM SUCCINATE 60 MG: 125 INJECTION, POWDER, FOR SOLUTION INTRAMUSCULAR; INTRAVENOUS at 04:30

## 2018-02-02 RX ADMIN — METOPROLOL TARTRATE 25 MG: 25 TABLET ORAL at 20:51

## 2018-02-02 RX ADMIN — METHYLPREDNISOLONE SODIUM SUCCINATE 60 MG: 125 INJECTION, POWDER, FOR SOLUTION INTRAMUSCULAR; INTRAVENOUS at 12:26

## 2018-02-02 RX ADMIN — ALBUTEROL SULFATE 2.5 MG: 2.5 SOLUTION RESPIRATORY (INHALATION) at 16:10

## 2018-02-02 RX ADMIN — LEVOFLOXACIN 750 MG: 5 INJECTION, SOLUTION INTRAVENOUS at 17:43

## 2018-02-02 RX ADMIN — ALBUTEROL SULFATE 2.5 MG: 2.5 SOLUTION RESPIRATORY (INHALATION) at 23:07

## 2018-02-02 RX ADMIN — ALBUTEROL SULFATE 2.5 MG: 2.5 SOLUTION RESPIRATORY (INHALATION) at 11:28

## 2018-02-02 RX ADMIN — INSULIN LISPRO 1 UNITS: 100 INJECTION, SOLUTION INTRAVENOUS; SUBCUTANEOUS at 21:04

## 2018-02-02 RX ADMIN — ALBUTEROL SULFATE 2.5 MG: 2.5 SOLUTION RESPIRATORY (INHALATION) at 07:30

## 2018-02-02 RX ADMIN — ALBUTEROL SULFATE 2.5 MG: 2.5 SOLUTION RESPIRATORY (INHALATION) at 03:11

## 2018-02-02 NOTE — PROGRESS NOTES
Pred or PB []  NA   []  Greater than 90%  []  81-90% []  71-80% [x]  Less than or equal to 70%  or unable to perform []  Unable due to Respiratory Distress   Dyspnea re []  Patient Baseline []  No SOB []  No SOB [x]  SOB on exertion []  SOB min activity []  At rest/acute   e FEV% Predicted       []  NA []  Above 69%  []  Unable []  Above 60-69%  []  Unable []  Above 50-59%  []  Unable [x]  Above 35-49%  []  Unable []  Less than 35%  []  Unable                 []  Hyperinflation Assessment  Score 1 2 3   CXR and Breath Sounds   []  Clear []  No atelectasis  Basilar aeration []  Atelectasis or absent basilar breath sounds   Incentive Spirometry Volume  (Per IBW)   []  Greater than or equal to 15ml/Kg []  less than 15ml/Kg []  less than 15ml/Kg   Surgery within last 2 weeks []  None or general   []  Abdominal or thoracic surgery  []  Abdominal or thoracic   Chronic Pulmonary Historyre []  No []  Yes []  Yes     []  Secretion Management Assessment  Score 1 2 3   Bilateral Breath Sounds   []  Occasional Rhonchi []  Scattered Rhonchi []  Course Rhonchi and/or poor aeration   Sputum    []  Small amount of thin secretions []  Moderate amount of viscous secretions []  Copius, Viscious Yellow/ Secretions   CXR as reported by physician []  clear  []  Unavailable []  Infiltrates and/or consolidation  []  Unavailable []  Mucus Plugging and or lobar consolidation  []  Unavailable   Cough []  Strong, productive cough []  Weak productive cough []  No cough or weak non-productive cough

## 2018-02-02 NOTE — PROGRESS NOTES
Progress Note    2/2/2018   1:48 PM    Name:  Adarsh Dumont  MRN:    [id number]     [place]:     [de-identified]   Room:  [address] [place] [address] Day: 2     Admit Date: 1/31/2018  4:18 PM  PCP: Parminder Ibrahim MD    Subjective:     C/C: No chief complaint on file. cough, difficulty breathing , sob ,fatigue chills        Interval History: Status: IMPROVED    ROS:  Constitutional: negative for chills, fevers and sweats  Respiratory: STILL WITH cough, dyspnea on exertion, no hemoptysis, and wheezing  Cardiovascular: negative for chest pain, chest pressure/discomfort, dyspnea, lower extremity edema and palpitations  Gastrointestinal: negative for abdominal pain, constipation, diarrhea, nausea and vomiting  Neurological: negative for dizziness and headaches have improved and resolved    Medications: Allergies: No Known Allergies    Current Meds:   metoprolol tartrate (LOPRESSOR) tablet 25 mg BID   albuterol (PROVENTIL) nebulizer solution 2.5 mg Q6H PRN   levofloxacin (LEVAQUIN) 750 MG/150ML infusion 750 mg Q24H   dextrose 5 % and 0.45 % sodium chloride infusion Continuous   nicotine (NICODERM CQ) 14 MG/24HR 1 patch Daily PRN   albuterol (PROVENTIL) nebulizer solution 2.5 mg Q4H   influenza quadrivalent split vaccine (FLUZONE;FLUARIX;FLULAVAL;AFLURIA) injection 0.5 mL Once   sodium chloride flush 0.9 % injection 10 mL PRN   methylPREDNISolone sodium (SOLU-MEDROL) injection 60 mg Q8H   magnesium sulfate 1 g in dextrose 5% 100 mL IVPB PRN       Data:     Code Status:  Full Code    No family history on file. Social History     Social History    Marital status: Single     Spouse name: N/A    Number of children: N/A    Years of education: N/A     Occupational History    Not on file.      Social History Main Topics    Smoking status: Current Every Day Smoker     Packs/day: 1.00     Types: Cigarettes    Smokeless tobacco: Never Used    Alcohol use No    Drug use: No    Sexual activity: Not on file     Other Topics Concern    Not on file     Social History Narrative    No narrative on file       Vitals:  BP (!) 136/49   Pulse 83   Temp 97.5 °F (36.4 °C) (Oral)   Resp 16   Ht 6' 3.2\" (1.91 m)   Wt 160 lb 0.9 oz (72.6 kg)   SpO2 93%   BMI 19.90 kg/m²   Temp (24hrs), Av.5 °F (36.4 °C), Min:97.3 °F (36.3 °C), Max:97.7 °F (36.5 °C)    No results for input(s): POCGLU in the last 72 hours. I/O (24Hr): Intake/Output Summary (Last 24 hours) at 18 1348  Last data filed at 18 0546   Gross per 24 hour   Intake             2576 ml   Output                0 ml   Net             2576 ml       Labs:  REVIEWED, ECHO REVIEWED    Physical Examination:        General appearance: alert, cooperative and no distress  Mental Status: oriented to person, place and time and normal affect  Lungs: clear to auscultation bilaterally, normal effort  Heart: regular rate and rhythm, no murmur,  Abdomen: soft, nontender, nondistended, bowel sounds present all four quadrants, no masses, hepatomegaly or splenomegaly  Extremities: no edema, redness or tenderness in the calves  Skin: no gross lesions, rashes, or induration    Assessment:        Primary Problem  Pneumonia     Active Hospital Problems    Diagnosis Date Noted    Chronic obstructive pulmonary disease with acute exacerbation ([facility] [address] [J44.1] 2018    PVC (premature ventricular contraction) [I49.3] 2018    Liver enzyme elevation [R74.8] 2018    Steroid-induced hyperglycemia [R73.9, T38.0X5A] 2018    Anemia [D64.9] 2018    Pneumonia [J18.9] 2018     No past medical history on file. Plan:        1. Pneumonia/ac copd/tabacco use  On iv steroids , atb , aerosol treatments , cxr pending   2. PVC  On lopressor discussed with cardiology , will need further monitoring as outpatient   3. Steroid induced hyperglycemia start sliding scale , discussed with patient  4.  Liver function abnormality could be sec to viral syndrome will do us liver ,

## 2018-02-03 ENCOUNTER — APPOINTMENT (OUTPATIENT)
Dept: ULTRASOUND IMAGING | Age: 58
DRG: 194 | End: 2018-02-03
Attending: FAMILY MEDICINE
Payer: COMMERCIAL

## 2018-02-03 LAB
ABSOLUTE EOS #: 0 K/UL (ref 0–0.4)
ABSOLUTE IMMATURE GRANULOCYTE: ABNORMAL K/UL (ref 0–0.3)
ABSOLUTE LYMPH #: 0.4 K/UL (ref 1–4.8)
ABSOLUTE MONO #: 0.3 K/UL (ref 0.2–0.8)
ALBUMIN SERPL-MCNC: 2.8 G/DL (ref 3.5–5.2)
ALBUMIN/GLOBULIN RATIO: ABNORMAL (ref 1–2.5)
ALP BLD-CCNC: 64 U/L (ref 35–104)
ALT SERPL-CCNC: 36 U/L (ref 5–33)
ANION GAP SERPL CALCULATED.3IONS-SCNC: 14 MMOL/L (ref 9–17)
AST SERPL-CCNC: 34 U/L
BASOPHILS # BLD: 2 % (ref 0–2)
BASOPHILS ABSOLUTE: 0.2 K/UL (ref 0–0.2)
BILIRUB SERPL-MCNC: 0.27 MG/DL (ref 0.3–1.2)
BUN BLDV-MCNC: 18 MG/DL (ref 6–20)
BUN/CREAT BLD: 43 (ref 9–20)
CALCIUM SERPL-MCNC: 8.8 MG/DL (ref 8.6–10.4)
CHLORIDE BLD-SCNC: 101 MMOL/L (ref 98–107)
CO2: 24 MMOL/L (ref 20–31)
CREAT SERPL-MCNC: 0.42 MG/DL (ref 0.5–0.9)
DIFFERENTIAL TYPE: ABNORMAL
EOSINOPHILS RELATIVE PERCENT: 0 % (ref 1–4)
GFR AFRICAN AMERICAN: >60 ML/MIN
GFR NON-AFRICAN AMERICAN: >60 ML/MIN
GFR SERPL CREATININE-BSD FRML MDRD: ABNORMAL ML/MIN/{1.73_M2}
GFR SERPL CREATININE-BSD FRML MDRD: ABNORMAL ML/MIN/{1.73_M2}
GLUCOSE BLD-MCNC: 131 MG/DL (ref 65–105)
GLUCOSE BLD-MCNC: 132 MG/DL (ref 65–105)
GLUCOSE BLD-MCNC: 137 MG/DL (ref 70–99)
GLUCOSE BLD-MCNC: 154 MG/DL (ref 65–105)
GLUCOSE BLD-MCNC: 168 MG/DL (ref 65–105)
HCT VFR BLD CALC: 34.1 % (ref 36–46)
HEMOGLOBIN: 11.1 G/DL (ref 12–16)
IMMATURE GRANULOCYTES: ABNORMAL %
LYMPHOCYTES # BLD: 3 % (ref 24–44)
MCH RBC QN AUTO: 30.6 PG (ref 26–34)
MCHC RBC AUTO-ENTMCNC: 32.6 G/DL (ref 31–37)
MCV RBC AUTO: 93.8 FL (ref 80–100)
MONOCYTES # BLD: 3 % (ref 1–7)
NRBC AUTOMATED: ABNORMAL PER 100 WBC
PDW BLD-RTO: 12.8 % (ref 11.5–14.5)
PLATELET # BLD: 439 K/UL (ref 130–400)
PLATELET ESTIMATE: ABNORMAL
PMV BLD AUTO: ABNORMAL FL (ref 6–12)
POTASSIUM SERPL-SCNC: 4.6 MMOL/L (ref 3.7–5.3)
RBC # BLD: 3.63 M/UL (ref 4–5.2)
RBC # BLD: ABNORMAL 10*6/UL
SEG NEUTROPHILS: 92 % (ref 36–66)
SEGMENTED NEUTROPHILS ABSOLUTE COUNT: 10.3 K/UL (ref 1.8–7.7)
SODIUM BLD-SCNC: 139 MMOL/L (ref 135–144)
TOTAL PROTEIN: 5.8 G/DL (ref 6.4–8.3)
WBC # BLD: 11.2 K/UL (ref 3.5–11)
WBC # BLD: ABNORMAL 10*3/UL

## 2018-02-03 PROCEDURE — 6360000002 HC RX W HCPCS: Performed by: FAMILY MEDICINE

## 2018-02-03 PROCEDURE — 80053 COMPREHEN METABOLIC PANEL: CPT

## 2018-02-03 PROCEDURE — 94760 N-INVAS EAR/PLS OXIMETRY 1: CPT

## 2018-02-03 PROCEDURE — 36415 COLL VENOUS BLD VENIPUNCTURE: CPT

## 2018-02-03 PROCEDURE — 94150 VITAL CAPACITY TEST: CPT

## 2018-02-03 PROCEDURE — 94667 MNPJ CHEST WALL 1ST: CPT

## 2018-02-03 PROCEDURE — 85025 COMPLETE CBC W/AUTO DIFF WBC: CPT

## 2018-02-03 PROCEDURE — 76705 ECHO EXAM OF ABDOMEN: CPT

## 2018-02-03 PROCEDURE — 94640 AIRWAY INHALATION TREATMENT: CPT

## 2018-02-03 PROCEDURE — 6370000000 HC RX 637 (ALT 250 FOR IP): Performed by: INTERNAL MEDICINE

## 2018-02-03 PROCEDURE — 82947 ASSAY GLUCOSE BLOOD QUANT: CPT

## 2018-02-03 PROCEDURE — 2060000000 HC ICU INTERMEDIATE R&B

## 2018-02-03 PROCEDURE — 6370000000 HC RX 637 (ALT 250 FOR IP): Performed by: FAMILY MEDICINE

## 2018-02-03 RX ORDER — GUAIFENESIN 600 MG/1
600 TABLET, EXTENDED RELEASE ORAL 2 TIMES DAILY
Status: DISCONTINUED | OUTPATIENT
Start: 2018-02-03 | End: 2018-02-04 | Stop reason: HOSPADM

## 2018-02-03 RX ORDER — LEVOFLOXACIN 500 MG/1
500 TABLET, FILM COATED ORAL DAILY
Status: DISCONTINUED | OUTPATIENT
Start: 2018-02-04 | End: 2018-02-04 | Stop reason: HOSPADM

## 2018-02-03 RX ORDER — ALBUTEROL SULFATE 2.5 MG/3ML
2.5 SOLUTION RESPIRATORY (INHALATION) 4 TIMES DAILY
Status: DISCONTINUED | OUTPATIENT
Start: 2018-02-04 | End: 2018-02-04 | Stop reason: HOSPADM

## 2018-02-03 RX ORDER — METHYLPREDNISOLONE SODIUM SUCCINATE 40 MG/ML
20 INJECTION, POWDER, LYOPHILIZED, FOR SOLUTION INTRAMUSCULAR; INTRAVENOUS EVERY 8 HOURS
Status: DISCONTINUED | OUTPATIENT
Start: 2018-02-03 | End: 2018-02-03

## 2018-02-03 RX ADMIN — METHYLPREDNISOLONE SODIUM SUCCINATE 40 MG: 125 INJECTION, POWDER, FOR SOLUTION INTRAMUSCULAR; INTRAVENOUS at 11:57

## 2018-02-03 RX ADMIN — ALBUTEROL SULFATE 2.5 MG: 2.5 SOLUTION RESPIRATORY (INHALATION) at 20:45

## 2018-02-03 RX ADMIN — METHYLPREDNISOLONE SODIUM SUCCINATE 40 MG: 125 INJECTION, POWDER, FOR SOLUTION INTRAMUSCULAR; INTRAVENOUS at 05:45

## 2018-02-03 RX ADMIN — ALBUTEROL SULFATE 2.5 MG: 2.5 SOLUTION RESPIRATORY (INHALATION) at 11:37

## 2018-02-03 RX ADMIN — LEVOFLOXACIN 750 MG: 5 INJECTION, SOLUTION INTRAVENOUS at 17:22

## 2018-02-03 RX ADMIN — METOPROLOL TARTRATE 25 MG: 25 TABLET ORAL at 11:57

## 2018-02-03 RX ADMIN — PREDNISONE 30 MG: 20 TABLET ORAL at 21:04

## 2018-02-03 RX ADMIN — ALBUTEROL SULFATE 2.5 MG: 2.5 SOLUTION RESPIRATORY (INHALATION) at 16:02

## 2018-02-03 RX ADMIN — ALBUTEROL SULFATE 2.5 MG: 2.5 SOLUTION RESPIRATORY (INHALATION) at 08:31

## 2018-02-03 RX ADMIN — ALBUTEROL SULFATE 2.5 MG: 2.5 SOLUTION RESPIRATORY (INHALATION) at 03:01

## 2018-02-03 RX ADMIN — METOPROLOL TARTRATE 25 MG: 25 TABLET ORAL at 19:27

## 2018-02-03 RX ADMIN — GUAIFENESIN 600 MG: 600 TABLET, EXTENDED RELEASE ORAL at 19:27

## 2018-02-03 ASSESSMENT — ENCOUNTER SYMPTOMS
SHORTNESS OF BREATH: 1
COUGH: 1

## 2018-02-04 VITALS
RESPIRATION RATE: 18 BRPM | BODY MASS INDEX: 21.68 KG/M2 | DIASTOLIC BLOOD PRESSURE: 63 MMHG | SYSTOLIC BLOOD PRESSURE: 112 MMHG | TEMPERATURE: 97.2 F | HEART RATE: 59 BPM | WEIGHT: 160.05 LBS | HEIGHT: 72 IN | OXYGEN SATURATION: 95 %

## 2018-02-04 LAB
ABSOLUTE EOS #: 0 K/UL (ref 0–0.4)
ABSOLUTE IMMATURE GRANULOCYTE: ABNORMAL K/UL (ref 0–0.3)
ABSOLUTE LYMPH #: 1.1 K/UL (ref 1–4.8)
ABSOLUTE MONO #: 0.6 K/UL (ref 0.2–0.8)
ALBUMIN SERPL-MCNC: 2.9 G/DL (ref 3.5–5.2)
ALBUMIN/GLOBULIN RATIO: ABNORMAL (ref 1–2.5)
ALP BLD-CCNC: 61 U/L (ref 35–104)
ALT SERPL-CCNC: 28 U/L (ref 5–33)
ANION GAP SERPL CALCULATED.3IONS-SCNC: 11 MMOL/L (ref 9–17)
AST SERPL-CCNC: 17 U/L
BASOPHILS # BLD: 0 % (ref 0–2)
BASOPHILS ABSOLUTE: 0 K/UL (ref 0–0.2)
BILIRUB SERPL-MCNC: 0.34 MG/DL (ref 0.3–1.2)
BUN BLDV-MCNC: 14 MG/DL (ref 6–20)
BUN/CREAT BLD: 33 (ref 9–20)
CALCIUM SERPL-MCNC: 8.7 MG/DL (ref 8.6–10.4)
CHLORIDE BLD-SCNC: 99 MMOL/L (ref 98–107)
CO2: 26 MMOL/L (ref 20–31)
CREAT SERPL-MCNC: 0.42 MG/DL (ref 0.5–0.9)
DIFFERENTIAL TYPE: ABNORMAL
EOSINOPHILS RELATIVE PERCENT: 0 % (ref 1–4)
GFR AFRICAN AMERICAN: >60 ML/MIN
GFR NON-AFRICAN AMERICAN: >60 ML/MIN
GFR SERPL CREATININE-BSD FRML MDRD: ABNORMAL ML/MIN/{1.73_M2}
GFR SERPL CREATININE-BSD FRML MDRD: ABNORMAL ML/MIN/{1.73_M2}
GLUCOSE BLD-MCNC: 112 MG/DL (ref 65–105)
GLUCOSE BLD-MCNC: 116 MG/DL (ref 70–99)
GLUCOSE BLD-MCNC: 99 MG/DL (ref 65–105)
HCT VFR BLD CALC: 33 % (ref 36–46)
HEMOGLOBIN: 11 G/DL (ref 12–16)
IMMATURE GRANULOCYTES: ABNORMAL %
LYMPHOCYTES # BLD: 10 % (ref 24–44)
MCH RBC QN AUTO: 31.6 PG (ref 26–34)
MCHC RBC AUTO-ENTMCNC: 33.4 G/DL (ref 31–37)
MCV RBC AUTO: 94.7 FL (ref 80–100)
MITOCHONDRIAL ANTIBODY: 2 UNITS (ref 0–20)
MONOCYTES # BLD: 6 % (ref 1–7)
NRBC AUTOMATED: ABNORMAL PER 100 WBC
PDW BLD-RTO: 13.4 % (ref 11.5–14.5)
PLATELET # BLD: 431 K/UL (ref 130–400)
PLATELET ESTIMATE: ABNORMAL
PMV BLD AUTO: 8.1 FL (ref 6–12)
POTASSIUM SERPL-SCNC: 4.6 MMOL/L (ref 3.7–5.3)
RBC # BLD: 3.48 M/UL (ref 4–5.2)
RBC # BLD: ABNORMAL 10*6/UL
SEG NEUTROPHILS: 84 % (ref 36–66)
SEGMENTED NEUTROPHILS ABSOLUTE COUNT: 8.9 K/UL (ref 1.8–7.7)
SODIUM BLD-SCNC: 136 MMOL/L (ref 135–144)
TOTAL PROTEIN: 5.7 G/DL (ref 6.4–8.3)
WBC # BLD: 10.6 K/UL (ref 3.5–11)
WBC # BLD: ABNORMAL 10*3/UL

## 2018-02-04 PROCEDURE — 82947 ASSAY GLUCOSE BLOOD QUANT: CPT

## 2018-02-04 PROCEDURE — 85025 COMPLETE CBC W/AUTO DIFF WBC: CPT

## 2018-02-04 PROCEDURE — 80053 COMPREHEN METABOLIC PANEL: CPT

## 2018-02-04 PROCEDURE — 94760 N-INVAS EAR/PLS OXIMETRY 1: CPT

## 2018-02-04 PROCEDURE — 6370000000 HC RX 637 (ALT 250 FOR IP): Performed by: INTERNAL MEDICINE

## 2018-02-04 PROCEDURE — 36415 COLL VENOUS BLD VENIPUNCTURE: CPT

## 2018-02-04 PROCEDURE — 94640 AIRWAY INHALATION TREATMENT: CPT

## 2018-02-04 PROCEDURE — 6360000002 HC RX W HCPCS: Performed by: FAMILY MEDICINE

## 2018-02-04 PROCEDURE — 6370000000 HC RX 637 (ALT 250 FOR IP): Performed by: FAMILY MEDICINE

## 2018-02-04 RX ORDER — AZITHROMYCIN 250 MG/1
TABLET, FILM COATED ORAL
Qty: 3 TABLET | Refills: 0 | Status: SHIPPED | OUTPATIENT
Start: 2018-02-04 | End: 2018-02-27 | Stop reason: ALTCHOICE

## 2018-02-04 RX ORDER — LEVOFLOXACIN 500 MG/1
500 TABLET, FILM COATED ORAL DAILY
Qty: 7 TABLET | Refills: 0 | Status: SHIPPED | OUTPATIENT
Start: 2018-02-05 | End: 2018-02-12

## 2018-02-04 RX ORDER — BENZONATATE 100 MG/1
100 CAPSULE ORAL 3 TIMES DAILY PRN
Qty: 30 CAPSULE | Refills: 0 | Status: SHIPPED | OUTPATIENT
Start: 2018-02-04 | End: 2018-02-11

## 2018-02-04 RX ORDER — PREDNISONE 10 MG/1
10 TABLET ORAL DAILY
Qty: 24 TABLET | Refills: 0 | Status: SHIPPED | OUTPATIENT
Start: 2018-02-05 | End: 2018-02-15

## 2018-02-04 RX ADMIN — ALBUTEROL SULFATE 2.5 MG: 2.5 SOLUTION RESPIRATORY (INHALATION) at 07:52

## 2018-02-04 RX ADMIN — PREDNISONE 30 MG: 20 TABLET ORAL at 08:36

## 2018-02-04 RX ADMIN — GUAIFENESIN 600 MG: 600 TABLET, EXTENDED RELEASE ORAL at 08:35

## 2018-02-04 RX ADMIN — LEVOFLOXACIN 500 MG: 500 TABLET, FILM COATED ORAL at 08:35

## 2018-02-04 RX ADMIN — ALBUTEROL SULFATE 2.5 MG: 2.5 SOLUTION RESPIRATORY (INHALATION) at 11:48

## 2018-02-04 RX ADMIN — METOPROLOL TARTRATE 25 MG: 25 TABLET ORAL at 08:35

## 2018-02-04 ASSESSMENT — ENCOUNTER SYMPTOMS
SHORTNESS OF BREATH: 1
COUGH: 1

## 2018-02-04 NOTE — CARE COORDINATION
Script written for nebulizer for discharge home today. Met with pt and spouse and discussed nebulizer. Spouse will take script tomorrow to [facility] in [place]. .  Pt will use spouse's machine this evening.

## 2018-02-04 NOTE — PLAN OF CARE
Problem: Airway Clearance - Ineffective:   Intervention: Assess cough ability  Strong dry npc    Goal: Clear lung sounds  Clear lung sounds   Outcome: Ongoing  Left lung scat ronchi  Goal: Ability to maintain a clear airway will improve  Ability to maintain a clear airway will improve   Outcome: Met This Shift  Pt denies complaint clearing airway secretions    Problem: Falls - Risk of  Goal: Absence of falls  Outcome: Met This Shift  Zero falls noted    Problem: Nutrition  Goal: Optimal nutrition therapy  Nutrition Problem: Inadequate oral intake  Intervention: Food and/or Nutrient Delivery: Continue current diet, Start ONS  Nutritional Goals: PO intake to meet >75% of estimated kcal/protein needs, with good GI tolerance   Outcome: Met This Shift  Pt eating and drinking well-good appetite

## 2018-02-04 NOTE — PLAN OF CARE
Problem: Nutrition  Goal: Optimal nutrition therapy  Nutrition Problem: Inadequate oral intake  Intervention: Food and/or Nutrient Delivery: Continue current diet, Start ONS  Nutritional Goals: PO intake to meet >75% of estimated kcal/protein needs, with good GI tolerance   Outcome: Ongoing  Patient has loss of appetite. Offered patient food that she likes. Encouraged increased oral intake. Will continue to monitor.

## 2018-02-04 NOTE — PROGRESS NOTES
[]  Greater than 25   Peak flow % of Pred or PB []  NA   []  Greater than 90%  []  81-90% []  71-80% [x]  Less than or equal to 70%  or unable to perform []  Unable due to Respiratory Distress   Dyspnea re []  Patient Baseline []  No SOB []  No SOB [x]  SOB on exertion []  SOB min activity []  At rest/acute   e FEV% Predicted       []  NA []  Above 69%  []  Unable []  Above 60-69%  []  Unable [x]  Above 50-59%  []  Unable []  Above 35-49%  []  Unable []  Less than 35%  []  Unable                 []  Hyperinflation Assessment  Score 1 2 3   CXR and Breath Sounds   []  Clear []  No atelectasis  Basilar aeration []  Atelectasis or absent basilar breath sounds   Incentive Spirometry Volume  (Per IBW)   []  Greater than or equal to 15ml/Kg []  less than 15ml/Kg []  less than 15ml/Kg   Surgery within last 2 weeks []  None or general   []  Abdominal or thoracic surgery  []  Abdominal or thoracic   Chronic Pulmonary Historyre []  No []  Yes []  Yes     []  Secretion Management Assessment  Score 1 2 3   Bilateral Breath Sounds   []  Occasional Rhonchi []  Scattered Rhonchi []  Course Rhonchi and/or poor aeration   Sputum    []  Small amount of thin secretions []  Moderate amount of viscous secretions []  Copius, Viscious Yellow/ Secretions   CXR as reported by physician []  clear  []  Unavailable []  Infiltrates and/or consolidation  []  Unavailable []  Mucus Plugging and or lobar consolidation  []  Unavailable   Cough []  Strong, productive cough []  Weak productive cough []  No cough or weak non-productive cough

## 2018-02-05 LAB
ALBUMIN (CALCULATED): 3 G/DL (ref 3.2–5.2)
ALBUMIN PERCENT: 49 % (ref 45–65)
ALPHA 1 PERCENT: 5 % (ref 3–6)
ALPHA 2 PERCENT: 24 % (ref 6–13)
ALPHA-1-GLOBULIN: 0.3 G/DL (ref 0.1–0.4)
ALPHA-2-GLOBULIN: 1.4 G/DL (ref 0.5–0.9)
BETA GLOBULIN: 0.7 G/DL (ref 0.5–1.1)
BETA PERCENT: 12 % (ref 11–19)
GAMMA GLOBULIN %: 10 % (ref 9–20)
GAMMA GLOBULIN: 0.6 G/DL (ref 0.5–1.5)
P E INTERPRETATION, U: NORMAL
PATHOLOGIST: ABNORMAL
PATHOLOGIST: NORMAL
PATHOLOGIST: NORMAL
PROTEIN ELECTROPHORESIS, SERUM: ABNORMAL
SERUM IFX INTERP: NORMAL
SPECIMEN TYPE: NORMAL
TOTAL PROT. SUM,%: 100 % (ref 98–102)
TOTAL PROT. SUM: 6 G/DL (ref 6.3–8.2)
TOTAL PROTEIN: 6 G/DL (ref 6.4–8.3)
URINE IFX INTERP: NORMAL
URINE IFX SPECIMEN: NORMAL
URINE TOTAL PROTEIN: 34 MG/DL
URINE TOTAL PROTEIN: 34 MG/DL
VOLUME: NORMAL ML

## 2018-02-05 NOTE — PROGRESS NOTES
Phone call recvd from pt  stating that there was some confusion with the prescriptions after discharge. Spoke with Bernardino Soares and Dr. Jaiden Alarcon and then Dr. Han Hernandez.  instructedt per Dr. Rosita Willard not to take Zithromax and to  Levaquin at Pharmacy. Albuterol also called into [facility] per Dr. Rosita Willard. Dr. Han Hernandez notified to clarify Prednisone RX and she will call that into the [facility] as Well.  updated and verbalized understanding.

## 2018-02-06 LAB
CULTURE: NORMAL
Lab: NORMAL
Lab: NORMAL
SPECIMEN DESCRIPTION: NORMAL
STATUS: NORMAL
STATUS: NORMAL

## 2018-02-08 ENCOUNTER — HOSPITAL ENCOUNTER (OUTPATIENT)
Dept: GENERAL RADIOLOGY | Age: 58
Discharge: HOME | End: 2018-02-10
Payer: COMMERCIAL

## 2018-02-08 ENCOUNTER — HOSPITAL ENCOUNTER (OUTPATIENT)
Age: 58
Discharge: HOME | End: 2018-02-10
Payer: COMMERCIAL

## 2018-02-08 ENCOUNTER — HOSPITAL ENCOUNTER (OUTPATIENT)
Dept: MRI IMAGING | Age: 58
Discharge: HOME | End: 2018-02-10
Payer: COMMERCIAL

## 2018-02-08 DIAGNOSIS — R74.8 ELEVATED LIVER ENZYMES: ICD-10-CM

## 2018-02-08 DIAGNOSIS — J18.9 PNEUMONIA DUE TO INFECTIOUS ORGANISM, UNSPECIFIED LATERALITY, UNSPECIFIED PART OF LUNG: ICD-10-CM

## 2018-02-08 PROCEDURE — 6360000004 HC RX CONTRAST MEDICATION: Performed by: FAMILY MEDICINE

## 2018-02-08 PROCEDURE — 71046 X-RAY EXAM CHEST 2 VIEWS: CPT

## 2018-02-08 PROCEDURE — A9579 GAD-BASE MR CONTRAST NOS,1ML: HCPCS | Performed by: FAMILY MEDICINE

## 2018-02-08 PROCEDURE — 2580000003 HC RX 258: Performed by: FAMILY MEDICINE

## 2018-02-08 PROCEDURE — 74183 MRI ABD W/O CNTR FLWD CNTR: CPT

## 2018-02-08 RX ORDER — SODIUM CHLORIDE 0.9 % (FLUSH) 0.9 %
10 SYRINGE (ML) INJECTION
Status: COMPLETED | OUTPATIENT
Start: 2018-02-08 | End: 2018-02-08

## 2018-02-08 RX ORDER — 0.9 % SODIUM CHLORIDE 0.9 %
20 INTRAVENOUS SOLUTION INTRAVENOUS
Status: DISCONTINUED | OUTPATIENT
Start: 2018-02-08 | End: 2018-02-11 | Stop reason: HOSPADM

## 2018-02-08 RX ADMIN — Medication 10 ML: at 08:21

## 2018-02-08 RX ADMIN — GADOTERIDOL 15 ML: 279.3 INJECTION, SOLUTION INTRAVENOUS at 08:20

## 2018-02-23 NOTE — DISCHARGE SUMMARY
[address]                 [address] [place] [address]                                 DISCHARGE SUMMARY    PATIENT NAME: Kalia Bean                     :        1960  MED REC NO:   [id number]                             ROOM:       3032  ACCOUNT NO:   [de-identified]                           ADMIT DATE: 2018  PROVIDER:     Etta Ramos. [facility]. COURSE:  This patient was admitted with pneumonia. She was  treated with Levaquin. It was decided that she would be discharged on  Levaquin and Aerosol and recheck an x-ray in five days. She had a mild  increase in liver functions and liver ultrasound showed a shadow on her  gallbladder. It was felt she needed an MRI, that it might be due to a  polyp. The patient initially came in on 2018. She had shortness of  breath and coughing. She was seen in the office for three weeks with a  history of getting worse and difficulty with breathing and shortness of  breath. After an urgent care visit, she was found to have influenza B, was  given Tamiflu, but then in the ER when she came for worsening cough, she  had pneumonia, was given a Z-LIGIA and then sent to the hospital for  admission. LABORATORY DATA:  X-ray showed multilobular pneumonia, on IV antibiotics,  steroids, and Aerosol. She also saw Cardiology for her shortness of breath. She had asymptomatic  ventricular arrhythmia including nonsustained VTach and PVCs, right lower  lung pneumonia and weight loss. Her potassium and magnesium were normal.   Cardiology decided to add Lopressor 25 twice a day for her following VTach  for prevention. By 2018, the patient was feeling better. She had no  shortness of breath. Aerosols were helping her wheezing. Her  steroid-induced hyperglycemia was treated with a sliding scale. She does  have a history of factor V Leiden carrier and DVT prophylaxis with Lovenox  was carried out.

## 2018-02-27 ENCOUNTER — HOSPITAL ENCOUNTER (OUTPATIENT)
Dept: PREADMISSION TESTING | Age: 58
Discharge: HOME | End: 2018-03-03
Payer: COMMERCIAL

## 2018-02-27 ENCOUNTER — ANESTHESIA EVENT (OUTPATIENT)
Dept: OPERATING ROOM | Age: 58
End: 2018-02-27
Payer: COMMERCIAL

## 2018-02-27 VITALS
HEIGHT: 72 IN | HEART RATE: 60 BPM | RESPIRATION RATE: 17 BRPM | OXYGEN SATURATION: 100 % | BODY MASS INDEX: 19.38 KG/M2 | WEIGHT: 143.08 LBS | SYSTOLIC BLOOD PRESSURE: 130 MMHG | TEMPERATURE: 98.1 F | DIASTOLIC BLOOD PRESSURE: 77 MMHG

## 2018-02-28 ENCOUNTER — ANESTHESIA (OUTPATIENT)
Dept: OPERATING ROOM | Age: 58
End: 2018-02-28
Payer: COMMERCIAL

## 2018-02-28 ENCOUNTER — HOSPITAL ENCOUNTER (OUTPATIENT)
Age: 58
Setting detail: OUTPATIENT SURGERY
Discharge: HOME | End: 2018-02-28
Attending: SURGERY | Admitting: SURGERY
Payer: COMMERCIAL

## 2018-02-28 VITALS
TEMPERATURE: 97.7 F | RESPIRATION RATE: 15 BRPM | DIASTOLIC BLOOD PRESSURE: 57 MMHG | HEART RATE: 58 BPM | SYSTOLIC BLOOD PRESSURE: 106 MMHG | OXYGEN SATURATION: 95 % | WEIGHT: 143 LBS | BODY MASS INDEX: 17.76 KG/M2

## 2018-02-28 VITALS — SYSTOLIC BLOOD PRESSURE: 125 MMHG | DIASTOLIC BLOOD PRESSURE: 62 MMHG | OXYGEN SATURATION: 98 % | TEMPERATURE: 96.4 F

## 2018-02-28 DIAGNOSIS — R93.2 ABNORMAL ULTRASOUND OF GALLBLADDER: Primary | ICD-10-CM

## 2018-02-28 PROCEDURE — 3600000019 HC SURGERY ROBOT ADDTL 15MIN: Performed by: SURGERY

## 2018-02-28 PROCEDURE — 88304 TISSUE EXAM BY PATHOLOGIST: CPT

## 2018-02-28 PROCEDURE — 6360000002 HC RX W HCPCS: Performed by: NURSE ANESTHETIST, CERTIFIED REGISTERED

## 2018-02-28 PROCEDURE — 3600000009 HC SURGERY ROBOT BASE: Performed by: SURGERY

## 2018-02-28 PROCEDURE — 6360000002 HC RX W HCPCS: Performed by: ANESTHESIOLOGY

## 2018-02-28 PROCEDURE — 2580000003 HC RX 258: Performed by: ANESTHESIOLOGY

## 2018-02-28 PROCEDURE — S2900 ROBOTIC SURGICAL SYSTEM: HCPCS | Performed by: SURGERY

## 2018-02-28 PROCEDURE — 7100000011 HC PHASE II RECOVERY - ADDTL 15 MIN: Performed by: SURGERY

## 2018-02-28 PROCEDURE — 7100000001 HC PACU RECOVERY - ADDTL 15 MIN: Performed by: SURGERY

## 2018-02-28 PROCEDURE — 2500000003 HC RX 250 WO HCPCS: Performed by: SURGERY

## 2018-02-28 PROCEDURE — 3700000000 HC ANESTHESIA ATTENDED CARE: Performed by: SURGERY

## 2018-02-28 PROCEDURE — 2500000003 HC RX 250 WO HCPCS: Performed by: NURSE ANESTHETIST, CERTIFIED REGISTERED

## 2018-02-28 PROCEDURE — 7100000000 HC PACU RECOVERY - FIRST 15 MIN: Performed by: SURGERY

## 2018-02-28 PROCEDURE — 2780000010 HC IMPLANT OTHER: Performed by: SURGERY

## 2018-02-28 PROCEDURE — 3700000001 HC ADD 15 MINUTES (ANESTHESIA): Performed by: SURGERY

## 2018-02-28 PROCEDURE — 7100000010 HC PHASE II RECOVERY - FIRST 15 MIN: Performed by: SURGERY

## 2018-02-28 RX ORDER — ONDANSETRON 2 MG/ML
INJECTION INTRAMUSCULAR; INTRAVENOUS PRN
Status: DISCONTINUED | OUTPATIENT
Start: 2018-02-28 | End: 2018-02-28 | Stop reason: SDUPTHER

## 2018-02-28 RX ORDER — ROCURONIUM BROMIDE 10 MG/ML
INJECTION, SOLUTION INTRAVENOUS PRN
Status: DISCONTINUED | OUTPATIENT
Start: 2018-02-28 | End: 2018-02-28 | Stop reason: SDUPTHER

## 2018-02-28 RX ORDER — HYDRALAZINE HYDROCHLORIDE 20 MG/ML
5 INJECTION INTRAMUSCULAR; INTRAVENOUS EVERY 10 MIN PRN
Status: DISCONTINUED | OUTPATIENT
Start: 2018-02-28 | End: 2018-03-01 | Stop reason: HOSPADM

## 2018-02-28 RX ORDER — DEXAMETHASONE SODIUM PHOSPHATE 10 MG/ML
INJECTION INTRAMUSCULAR; INTRAVENOUS PRN
Status: DISCONTINUED | OUTPATIENT
Start: 2018-02-28 | End: 2018-02-28 | Stop reason: SDUPTHER

## 2018-02-28 RX ORDER — SODIUM CHLORIDE 0.9 % (FLUSH) 0.9 %
10 SYRINGE (ML) INJECTION PRN
Status: DISCONTINUED | OUTPATIENT
Start: 2018-02-28 | End: 2018-03-01 | Stop reason: HOSPADM

## 2018-02-28 RX ORDER — SODIUM CHLORIDE, SODIUM LACTATE, POTASSIUM CHLORIDE, CALCIUM CHLORIDE 600; 310; 30; 20 MG/100ML; MG/100ML; MG/100ML; MG/100ML
INJECTION, SOLUTION INTRAVENOUS CONTINUOUS
Status: DISCONTINUED | OUTPATIENT
Start: 2018-03-01 | End: 2018-03-01 | Stop reason: HOSPADM

## 2018-02-28 RX ORDER — HYDROCODONE BITARTRATE AND ACETAMINOPHEN 5; 325 MG/1; MG/1
1 TABLET ORAL EVERY 6 HOURS PRN
Qty: 20 TABLET | Refills: 0 | Status: SHIPPED | OUTPATIENT
Start: 2018-02-28 | End: 2018-03-07

## 2018-02-28 RX ORDER — SODIUM CHLORIDE 0.9 % (FLUSH) 0.9 %
10 SYRINGE (ML) INJECTION EVERY 12 HOURS SCHEDULED
Status: DISCONTINUED | OUTPATIENT
Start: 2018-02-28 | End: 2018-03-01 | Stop reason: HOSPADM

## 2018-02-28 RX ORDER — FENTANYL CITRATE 50 UG/ML
25 INJECTION, SOLUTION INTRAMUSCULAR; INTRAVENOUS EVERY 5 MIN PRN
Status: DISCONTINUED | OUTPATIENT
Start: 2018-02-28 | End: 2018-03-01 | Stop reason: HOSPADM

## 2018-02-28 RX ORDER — LIDOCAINE HYDROCHLORIDE 20 MG/ML
INJECTION, SOLUTION INFILTRATION; PERINEURAL PRN
Status: DISCONTINUED | OUTPATIENT
Start: 2018-02-28 | End: 2018-02-28 | Stop reason: SDUPTHER

## 2018-02-28 RX ORDER — DEXAMETHASONE SODIUM PHOSPHATE 10 MG/ML
4 INJECTION INTRAMUSCULAR; INTRAVENOUS
Status: ACTIVE | OUTPATIENT
Start: 2018-02-28 | End: 2018-02-28

## 2018-02-28 RX ORDER — HYDROMORPHONE HCL 110MG/55ML
0.5 PATIENT CONTROLLED ANALGESIA SYRINGE INTRAVENOUS EVERY 5 MIN PRN
Status: DISCONTINUED | OUTPATIENT
Start: 2018-02-28 | End: 2018-03-01 | Stop reason: HOSPADM

## 2018-02-28 RX ORDER — PROPOFOL 10 MG/ML
INJECTION, EMULSION INTRAVENOUS PRN
Status: DISCONTINUED | OUTPATIENT
Start: 2018-02-28 | End: 2018-02-28 | Stop reason: SDUPTHER

## 2018-02-28 RX ORDER — MEPERIDINE HYDROCHLORIDE 25 MG/ML
12.5 INJECTION INTRAMUSCULAR; INTRAVENOUS; SUBCUTANEOUS EVERY 5 MIN PRN
Status: DISCONTINUED | OUTPATIENT
Start: 2018-02-28 | End: 2018-03-01 | Stop reason: HOSPADM

## 2018-02-28 RX ORDER — SODIUM CHLORIDE 9 MG/ML
INJECTION, SOLUTION INTRAVENOUS CONTINUOUS
Status: DISCONTINUED | OUTPATIENT
Start: 2018-03-01 | End: 2018-02-28

## 2018-02-28 RX ORDER — GLYCOPYRROLATE 0.2 MG/ML
INJECTION INTRAMUSCULAR; INTRAVENOUS PRN
Status: DISCONTINUED | OUTPATIENT
Start: 2018-02-28 | End: 2018-02-28 | Stop reason: SDUPTHER

## 2018-02-28 RX ORDER — BUPIVACAINE HYDROCHLORIDE AND EPINEPHRINE 5; 5 MG/ML; UG/ML
INJECTION, SOLUTION EPIDURAL; INTRACAUDAL; PERINEURAL PRN
Status: DISCONTINUED | OUTPATIENT
Start: 2018-02-28 | End: 2018-03-01 | Stop reason: HOSPADM

## 2018-02-28 RX ORDER — LABETALOL HYDROCHLORIDE 5 MG/ML
5 INJECTION, SOLUTION INTRAVENOUS EVERY 10 MIN PRN
Status: DISCONTINUED | OUTPATIENT
Start: 2018-02-28 | End: 2018-03-01 | Stop reason: HOSPADM

## 2018-02-28 RX ORDER — KETOROLAC TROMETHAMINE 30 MG/ML
INJECTION, SOLUTION INTRAMUSCULAR; INTRAVENOUS PRN
Status: DISCONTINUED | OUTPATIENT
Start: 2018-02-28 | End: 2018-02-28 | Stop reason: SDUPTHER

## 2018-02-28 RX ORDER — ONDANSETRON 2 MG/ML
4 INJECTION INTRAMUSCULAR; INTRAVENOUS
Status: COMPLETED | OUTPATIENT
Start: 2018-02-28 | End: 2018-02-28

## 2018-02-28 RX ORDER — DIPHENHYDRAMINE HYDROCHLORIDE 50 MG/ML
12.5 INJECTION INTRAMUSCULAR; INTRAVENOUS
Status: ACTIVE | OUTPATIENT
Start: 2018-02-28 | End: 2018-02-28

## 2018-02-28 RX ORDER — FENTANYL CITRATE 50 UG/ML
INJECTION, SOLUTION INTRAMUSCULAR; INTRAVENOUS PRN
Status: DISCONTINUED | OUTPATIENT
Start: 2018-02-28 | End: 2018-02-28 | Stop reason: SDUPTHER

## 2018-02-28 RX ORDER — LIDOCAINE HYDROCHLORIDE 10 MG/ML
1 INJECTION, SOLUTION EPIDURAL; INFILTRATION; INTRACAUDAL; PERINEURAL
Status: DISCONTINUED | OUTPATIENT
Start: 2018-03-01 | End: 2018-03-01 | Stop reason: HOSPADM

## 2018-02-28 RX ADMIN — FENTANYL CITRATE 100 MCG: 50 INJECTION, SOLUTION INTRAMUSCULAR; INTRAVENOUS at 13:11

## 2018-02-28 RX ADMIN — CEFAZOLIN SODIUM 2 G: 2 SOLUTION INTRAVENOUS at 13:33

## 2018-02-28 RX ADMIN — KETOROLAC TROMETHAMINE 30 MG: 30 INJECTION, SOLUTION INTRAMUSCULAR; INTRAVENOUS at 14:19

## 2018-02-28 RX ADMIN — FENTANYL CITRATE 25 MCG: 50 INJECTION INTRAMUSCULAR; INTRAVENOUS at 15:40

## 2018-02-28 RX ADMIN — ONDANSETRON 4 MG: 2 INJECTION INTRAMUSCULAR; INTRAVENOUS at 15:22

## 2018-02-28 RX ADMIN — SODIUM CHLORIDE, POTASSIUM CHLORIDE, SODIUM LACTATE AND CALCIUM CHLORIDE: 600; 310; 30; 20 INJECTION, SOLUTION INTRAVENOUS at 14:24

## 2018-02-28 RX ADMIN — NEOSTIGMINE METHYLSULFATE 4 MG: 1 INJECTION INTRAMUSCULAR; INTRAVENOUS; SUBCUTANEOUS at 14:21

## 2018-02-28 RX ADMIN — DEXAMETHASONE SODIUM PHOSPHATE 10 MG: 10 INJECTION INTRAMUSCULAR; INTRAVENOUS at 13:43

## 2018-02-28 RX ADMIN — FENTANYL CITRATE 25 MCG: 50 INJECTION INTRAMUSCULAR; INTRAVENOUS at 15:15

## 2018-02-28 RX ADMIN — FENTANYL CITRATE 50 MCG: 50 INJECTION, SOLUTION INTRAMUSCULAR; INTRAVENOUS at 14:26

## 2018-02-28 RX ADMIN — FENTANYL CITRATE 50 MCG: 50 INJECTION, SOLUTION INTRAMUSCULAR; INTRAVENOUS at 13:34

## 2018-02-28 RX ADMIN — FENTANYL CITRATE 25 MCG: 50 INJECTION INTRAMUSCULAR; INTRAVENOUS at 15:30

## 2018-02-28 RX ADMIN — SODIUM CHLORIDE, POTASSIUM CHLORIDE, SODIUM LACTATE AND CALCIUM CHLORIDE: 600; 310; 30; 20 INJECTION, SOLUTION INTRAVENOUS at 12:08

## 2018-02-28 RX ADMIN — PROPOFOL 150 MG: 10 INJECTION, EMULSION INTRAVENOUS at 13:17

## 2018-02-28 RX ADMIN — Medication 0.8 MG: at 14:21

## 2018-02-28 RX ADMIN — LIDOCAINE HYDROCHLORIDE 100 MG: 20 INJECTION, SOLUTION INFILTRATION; PERINEURAL at 13:17

## 2018-02-28 RX ADMIN — ROCURONIUM BROMIDE 50 MG: 10 INJECTION, SOLUTION INTRAVENOUS at 13:17

## 2018-02-28 RX ADMIN — FENTANYL CITRATE 50 MCG: 50 INJECTION, SOLUTION INTRAMUSCULAR; INTRAVENOUS at 13:17

## 2018-02-28 RX ADMIN — ONDANSETRON 4 MG: 2 INJECTION, SOLUTION INTRAMUSCULAR; INTRAVENOUS at 13:43

## 2018-02-28 ASSESSMENT — PULMONARY FUNCTION TESTS
PIF_VALUE: 22
PIF_VALUE: 17
PIF_VALUE: 17
PIF_VALUE: 24
PIF_VALUE: 18
PIF_VALUE: 25
PIF_VALUE: 24
PIF_VALUE: 3
PIF_VALUE: 18
PIF_VALUE: 17
PIF_VALUE: 20
PIF_VALUE: 25
PIF_VALUE: 0
PIF_VALUE: 18
PIF_VALUE: 26
PIF_VALUE: 18
PIF_VALUE: 25
PIF_VALUE: 26
PIF_VALUE: 24
PIF_VALUE: 25
PIF_VALUE: 24
PIF_VALUE: 19
PIF_VALUE: 3
PIF_VALUE: 26
PIF_VALUE: 25
PIF_VALUE: 25
PIF_VALUE: 1
PIF_VALUE: 17
PIF_VALUE: 18
PIF_VALUE: 16
PIF_VALUE: 17
PIF_VALUE: 0
PIF_VALUE: 2
PIF_VALUE: 18
PIF_VALUE: 18
PIF_VALUE: 2
PIF_VALUE: 25
PIF_VALUE: 25
PIF_VALUE: 24
PIF_VALUE: 25
PIF_VALUE: 17
PIF_VALUE: 0
PIF_VALUE: 24
PIF_VALUE: 25
PIF_VALUE: 24
PIF_VALUE: 0
PIF_VALUE: 3
PIF_VALUE: 18
PIF_VALUE: 2
PIF_VALUE: 18
PIF_VALUE: 25
PIF_VALUE: 23
PIF_VALUE: 20
PIF_VALUE: 25
PIF_VALUE: 17
PIF_VALUE: 25
PIF_VALUE: 24
PIF_VALUE: 25
PIF_VALUE: 3
PIF_VALUE: 19
PIF_VALUE: 19
PIF_VALUE: 24
PIF_VALUE: 3
PIF_VALUE: 24
PIF_VALUE: 11
PIF_VALUE: 24
PIF_VALUE: 25
PIF_VALUE: 20
PIF_VALUE: 22
PIF_VALUE: 25
PIF_VALUE: 22
PIF_VALUE: 24
PIF_VALUE: 24
PIF_VALUE: 22
PIF_VALUE: 25
PIF_VALUE: 5
PIF_VALUE: 4
PIF_VALUE: 24
PIF_VALUE: 20
PIF_VALUE: 1
PIF_VALUE: 23
PIF_VALUE: 2
PIF_VALUE: 2
PIF_VALUE: 23

## 2018-02-28 ASSESSMENT — PAIN SCALES - GENERAL
PAINLEVEL_OUTOF10: 5
PAINLEVEL_OUTOF10: 6
PAINLEVEL_OUTOF10: 4
PAINLEVEL_OUTOF10: 4
PAINLEVEL_OUTOF10: 6
PAINLEVEL_OUTOF10: 5

## 2018-02-28 ASSESSMENT — PAIN - FUNCTIONAL ASSESSMENT: PAIN_FUNCTIONAL_ASSESSMENT: 0-10

## 2018-02-28 ASSESSMENT — PAIN DESCRIPTION - ORIENTATION
ORIENTATION: LEFT;LOWER

## 2018-02-28 ASSESSMENT — PAIN DESCRIPTION - LOCATION
LOCATION: ABDOMEN

## 2018-02-28 ASSESSMENT — PAIN DESCRIPTION - DESCRIPTORS
DESCRIPTORS: ACHING
DESCRIPTORS: CRAMPING
DESCRIPTORS: CRAMPING

## 2018-02-28 ASSESSMENT — PAIN DESCRIPTION - PAIN TYPE
TYPE: SURGICAL PAIN

## 2018-02-28 ASSESSMENT — PAIN DESCRIPTION - FREQUENCY
FREQUENCY: CONTINUOUS

## 2018-02-28 NOTE — H&P (VIEW-ONLY)
symptoms initiation  4.  Factor  V Leiden carrier       Electronically signed by Barak Santos MD on 2/1/2018 at 12:26 PM

## 2018-02-28 NOTE — ANESTHESIA POSTPROCEDURE EVALUATION
Department of Anesthesiology  Postprocedure Note    Patient: Cailin Blake  MRN: [id number]  YOB: 1960  Date of evaluation: 2/28/2018  Time:  2:48 PM     Procedure Summary     Date:  02/28/18 Room / Location:  [facility] 04 / Candida Darrin    Anesthesia Start:  8578 Anesthesia Stop:  3909    Procedure:  CHOLECYSTECTOMY LAPAROSCOPIC ROBOTIC (N/A Abdomen) Diagnosis:  (ABNORMAL GALLBLADDER US )    Surgeon:  Warner Foley MD Responsible Provider:  Cassi Cm MD    Anesthesia Type:  general ASA Status:  2          Anesthesia Type: No value filed. Killian Phase I:      Killian Phase II:      Last vitals: Reviewed and per EMR flowsheets.        Anesthesia Post Evaluation    Patient location during evaluation: PACU  Level of consciousness: awake  Pain score: 2  Airway patency: patent  Nausea & Vomiting: no nausea and no vomiting  Complications: no  Cardiovascular status: blood pressure returned to baseline  Respiratory status: acceptable  Hydration status: euvolemic

## 2018-02-28 NOTE — ANESTHESIA PRE PROCEDURE
Department of Anesthesiology  Preprocedure Note       Name:  Pineda Muñiz   Age:  61 y.o.  :  1960                                          MRN:  [id number]         Date:  2018      Surgeon: Rm Conley): Mat Espinoza MD    Procedure: Procedure(s):  CHOLECYSTECTOMY LAPAROSCOPIC ROBOTIC    Medications prior to admission:   Prior to Admission medications    Medication Sig Start Date End Date Taking? Authorizing Provider   metoprolol tartrate (LOPRESSOR) 25 MG tablet Take 1 tablet by mouth 2 times daily 18   Vaibhav Black MD   albuterol sulfate HFA (PROVENTIL HFA) 108 (90 Base) MCG/ACT inhaler Inhale 2 puffs into the lungs every 4 hours as needed for Wheezing or Shortness of Breath (Space out to every 6 hours as symptoms improve) Space out to every 6 hours as symptoms improve.  18   Wayne Britt MD   ibuprofen (ADVIL;MOTRIN) 800 MG tablet Take 1 tablet by mouth every 8 hours as needed for Pain 16   Jimena Chambers MD       Current medications:    Current Facility-Administered Medications   Medication Dose Route Frequency Provider Last Rate Last Dose    fentaNYL (SUBLIMAZE) injection 25 mcg  25 mcg Intravenous Q5 Min PRN Sona Silvestre MD        HYDROmorphone (DILAUDID) injection 0.5 mg  0.5 mg Intravenous Q5 Min PRN Sona Silvestre MD        fentaNYL (SUBLIMAZE) injection 25 mcg  25 mcg Intravenous Q5 Min PRN Sona Silvestre MD        HYDROmorphone (DILAUDID) injection 0.5 mg  0.5 mg Intravenous Q5 Min PRN Sona Silvestre MD        diphenhydrAMINE (BENADRYL) injection 12.5 mg  12.5 mg Intravenous Once PRN Greg Restrepo MD        ondansetron (ZOFRAN) injection 4 mg  4 mg Intravenous Once PRN Sona Silvestre MD        dexamethasone (DECADRON) injection 4 mg  4 mg Intravenous Once PRN Greg Restrepo MD        labetalol (NORMODYNE;TRANDATE) injection 5 mg  5 mg Intravenous Q10 Min PRN Sona Silvestre MD        hydrALAZINE (APRESOLINE) injection 5 mg  5 mg Intravenous Q10 Min PRN Grant Toscano MD        meperidine (DEMEROL) injection 12.5 mg  12.5 mg Intravenous Q5 Min PRN Sona Gutierres MD           Allergies:  No Known Allergies    Problem List:    Patient Active Problem List   Diagnosis Code    Pneumonia J18.9    Chronic obstructive pulmonary disease with acute exacerbation (HCC) J44.1    PVC (premature ventricular contraction) I49.3    Liver enzyme elevation R74.8    Steroid-induced hyperglycemia R73.9, T38.0X5A    Anemia D64.9       Past Medical History:        Diagnosis Date    Cervical dysplasia     Factor 5 Leiden mutation, heterozygous ([facility] [address]     No blood thinners, no past clot history per pt.  Influenza B 01/16/2018    Leaky heart valve 01/31/2018    Due to the Influenza B per pt.  Pneumonia 01/31/2018       Past Surgical History:        Procedure Laterality Date    KNEE ARTHROSCOPY      x4 on Right, x1 Left Knee    TONSILLECTOMY  1964    TUBAL LIGATION         Social History:    Social History   Substance Use Topics    Smoking status: Current Every Day Smoker     Packs/day: 0.50     Types: Cigarettes    Smokeless tobacco: Never Used    Alcohol use No                                Ready to quit: Not Answered  Counseling given: Not Answered      Vital Signs (Current): There were no vitals filed for this visit.                                            BP Readings from Last 3 Encounters:   02/27/18 130/77   02/04/18 112/63   01/27/18 131/74       NPO Status:                                                                                 BMI:   Wt Readings from Last 3 Encounters:   02/27/18 143 lb 1.3 oz (64.9 kg)   01/31/18 160 lb 0.9 oz (72.6 kg)   01/27/18 160 lb (72.6 kg)     There is no height or weight on file to calculate BMI.    CBC:   Lab Results   Component Value Date    WBC 10.6 02/04/2018    RBC 3.48 02/04/2018    HGB 11.0 02/04/2018    HCT 33.0 02/04/2018    MCV 94.7 02/04/2018    RDW 13.4 02/04/2018     02/04/2018       CMP:   Lab Results   Component Value Date     02/04/2018    K 4.6 02/04/2018    CL 99 02/04/2018    CO2 26 02/04/2018    BUN 14 02/04/2018    CREATININE 0.42 02/04/2018    GFRAA >60 02/04/2018    LABGLOM >60 02/04/2018    GLUCOSE 116 02/04/2018    PROT 5.7 02/04/2018    CALCIUM 8.7 02/04/2018    BILITOT 0.34 02/04/2018    ALKPHOS 61 02/04/2018    AST 17 02/04/2018    ALT 28 02/04/2018       POC Tests: No results for input(s): POCGLU, POCNA, POCK, POCCL, POCBUN, POCHEMO, POCHCT in the last 72 hours. Coags: No results found for: PROTIME, INR, APTT    HCG (If Applicable): No results found for: PREGTESTUR, PREGSERUM, HCG, HCGQUANT     ABGs: No results found for: PHART, PO2ART, UEU8UOJ, MHI3RAC, BEART, Z1TFTSGF     Type & Screen (If Applicable):  No results found for: [facility]    Anesthesia Evaluation  Patient summary reviewed and Nursing notes reviewed  Airway: Mallampati: III  TM distance: >3 FB   Neck ROM: full  Mouth opening: > = 3 FB Dental: normal exam         Pulmonary:normal exam  breath sounds clear to auscultation                             Cardiovascular:  Exercise tolerance: good (>4 METS),           Rhythm: regular  Rate: normal                    Neuro/Psych:               GI/Hepatic/Renal:             Endo/Other:                     Abdominal:       Abdomen: soft. Vascular:                                      Anesthesia Plan      general     ASA 2       Induction: intravenous. MIPS: Postoperative opioids intended and Prophylactic antiemetics administered. Anesthetic plan and risks discussed with patient. Use of blood products discussed with patient whom consented to blood products. Plan discussed with attending and CRNA.     Attending anesthesiologist reviewed and agrees with Thee Andino MD   2/28/2018

## 2018-03-01 NOTE — OP NOTE
[address] [facility] [address]                 [address] [place] [address]                                 OPERATIVE REPORT    PATIENT NAME: Vinny Wolf                     :        1960  MED REC NO:   [id number]                             ROOM:  ACCOUNT NO:   [de-identified]                           ADMIT DATE: 2018  PROVIDER:     Rika Orozco    DATE OF PROCEDURE:  2018    PREOPERATIVE DIAGNOSIS:  Abnormal gallbladder ultrasound. POSTOPERATIVE DIAGNOSIS:  Abnormal gallbladder ultrasound, distended,  chronic cholecystitis. PROCEDURE:  Robotic multi-port cholecystectomy. SURGEON:  Dr. Hillary Jamil:  General endotracheal.    ESTIMATED BLOOD LOSS:  20 mL. FLUIDS:  1 liter of crystalloid. DRAINS:  None. INDICATIONS:  This is a 58-year-old female who initially had some abnormal  blood work that showed some elevation of liver functions. The patient had  a gallbladder ultrasound that showed findings of a gallbladder polypoid  lesion. The patient now comes in for cholecystectomy procedure. OPERATIVE PROCEDURE:  The patient was brought into the operating room and  placed in a supine position. After induction of general anesthesia, the  patient's abdomen was prepped and draped in the usual sterile fashion. Initially, an infraumbilical incision was made and the incision was taken  down to the fascia. The fascia was grasped by using 0 Vicryl sutures. The  fascia was next cut in between the sutures. The abdominal cavity was next  entered. The Aristeo trocar with balloon was inserted. The laparoscope was  inserted. Under direct visualization, an 8 mm trocar was inserted in the  left lateral abdomen and two 8 mm trocars in the right abdomen. The  patient was next put in the reverse Trendelenburg position with the body  turned to the patient's left side. Docking of the robot was next  undertaken.   The patient's gallbladder was

## 2018-03-02 LAB — SURGICAL PATHOLOGY REPORT: NORMAL

## 2019-09-15 ENCOUNTER — HOSPITAL ENCOUNTER (OUTPATIENT)
Dept: GENERAL RADIOLOGY | Facility: CLINIC | Age: 59
Discharge: HOME | End: 2019-09-17
Payer: COMMERCIAL

## 2019-09-15 ENCOUNTER — HOSPITAL ENCOUNTER (EMERGENCY)
Facility: CLINIC | Age: 59
Discharge: HOME | End: 2019-09-15
Attending: EMERGENCY MEDICINE
Payer: COMMERCIAL

## 2019-09-15 VITALS
HEIGHT: 72 IN | DIASTOLIC BLOOD PRESSURE: 84 MMHG | WEIGHT: 165 LBS | SYSTOLIC BLOOD PRESSURE: 156 MMHG | RESPIRATION RATE: 18 BRPM | TEMPERATURE: 97.9 F | OXYGEN SATURATION: 95 % | HEART RATE: 77 BPM | BODY MASS INDEX: 22.35 KG/M2

## 2019-09-15 DIAGNOSIS — R50.9 FEVER, UNSPECIFIED FEVER CAUSE: ICD-10-CM

## 2019-09-15 DIAGNOSIS — R05.9 COUGH: ICD-10-CM

## 2019-09-15 DIAGNOSIS — J20.9 BRONCHITIS WITH BRONCHOSPASM: Primary | ICD-10-CM

## 2019-09-15 PROCEDURE — 99282 EMERGENCY DEPT VISIT SF MDM: CPT

## 2019-09-15 PROCEDURE — 71046 X-RAY EXAM CHEST 2 VIEWS: CPT

## 2019-09-15 PROCEDURE — 6370000000 HC RX 637 (ALT 250 FOR IP): Performed by: EMERGENCY MEDICINE

## 2019-09-15 RX ORDER — ALBUTEROL SULFATE 90 UG/1
2 AEROSOL, METERED RESPIRATORY (INHALATION) EVERY 6 HOURS PRN
Status: DISCONTINUED | OUTPATIENT
Start: 2019-09-15 | End: 2019-09-15 | Stop reason: HOSPADM

## 2019-09-15 RX ORDER — AZITHROMYCIN 250 MG/1
TABLET, FILM COATED ORAL
Qty: 1 PACKET | Refills: 0 | Status: SHIPPED | OUTPATIENT
Start: 2019-09-15 | End: 2019-09-25

## 2019-09-15 RX ORDER — IPRATROPIUM BROMIDE AND ALBUTEROL SULFATE 2.5; .5 MG/3ML; MG/3ML
SOLUTION RESPIRATORY (INHALATION)
Status: DISCONTINUED
Start: 2019-09-15 | End: 2019-09-15 | Stop reason: WASHOUT

## 2019-09-15 RX ORDER — PREDNISONE 10 MG/1
TABLET ORAL
Qty: 20 TABLET | Refills: 0 | Status: SHIPPED | OUTPATIENT
Start: 2019-09-15 | End: 2019-09-25

## 2019-09-15 RX ORDER — IPRATROPIUM BROMIDE AND ALBUTEROL SULFATE 2.5; .5 MG/3ML; MG/3ML
SOLUTION RESPIRATORY (INHALATION)
Status: DISPENSED
Start: 2019-09-15 | End: 2019-09-15

## 2019-09-15 RX ADMIN — ALBUTEROL SULFATE 2 PUFF: 90 AEROSOL, METERED RESPIRATORY (INHALATION) at 08:38

## 2019-09-15 NOTE — ED PROVIDER NOTES
[facility]  [address] [facility]  Phone: [phone]        Pt Name: Akila Huston  MRN: [id number]  [place] 1960  Date of evaluation: 9/15/19      CHIEF COMPLAINT       Chief Complaint   Patient presents with    URI         HISTORY OF PRESENT ILLNESS    Akila Huston is a 60 y.o. female who presents with chief complaint of a cough and congestion is been going on for about a week she is had some wheezing associated with that she is a smoker she does have a history of pneumonia. There is been no nausea vomiting or diarrhea      REVIEW OF SYSTEMS       Great general no fevers or chills HEENT no sore throat or ear pain she has had some congestion  Respiratory cough and wheezes  Cardiovascular no chest pain or palpitations  GI no nausea vomiting or diarrhea  Skin no rashes or pallor  Neurologic no headaches or syncope    PAST MEDICAL HISTORY    has a past medical history of Cervical dysplasia, Factor 5 Leiden mutation, heterozygous ([facility] [address] Influenza B, Leaky heart valve, and Pneumonia. SURGICAL HISTORY      has a past surgical history that includes Tubal ligation; Knee arthroscopy; Tonsillectomy (1964); Cholecystectomy (02/28/2018); and pr lap,cholecystectomy (N/A, 2/28/2018). CURRENT MEDICATIONS       Previous Medications    No medications on file       ALLERGIES     has No Known Allergies. FAMILY HISTORY     has no family status information on file. family history is not on file. SOCIAL HISTORY      reports that she has been smoking cigarettes. She has been smoking about 1.00 pack per day. She has never used smokeless tobacco. She reports that she does not drink alcohol or use drugs. PHYSICAL EXAM     INITIAL VITALS:  height is 6' 3\" (1.905 m) and weight is 74.8 kg (165 lb). Her oral temperature is 97.9 °F (36.6 °C). Her blood pressure is 156/84 (abnormal) and her pulse is 77. Her respiration is 18 and oxygen saturation is 95%.       General well-developed nontoxic and

## 2020-10-05 ENCOUNTER — APPOINTMENT (OUTPATIENT)
Dept: GENERAL RADIOLOGY | Facility: CLINIC | Age: 60
End: 2020-10-05
Payer: COMMERCIAL

## 2020-10-05 ENCOUNTER — HOSPITAL ENCOUNTER (EMERGENCY)
Facility: CLINIC | Age: 60
Discharge: HOME | End: 2020-10-05
Attending: EMERGENCY MEDICINE
Payer: COMMERCIAL

## 2020-10-05 VITALS
OXYGEN SATURATION: 100 % | RESPIRATION RATE: 20 BRPM | WEIGHT: 140 LBS | TEMPERATURE: 98.3 F | HEIGHT: 72 IN | DIASTOLIC BLOOD PRESSURE: 69 MMHG | HEART RATE: 59 BPM | SYSTOLIC BLOOD PRESSURE: 167 MMHG | BODY MASS INDEX: 18.96 KG/M2

## 2020-10-05 PROCEDURE — 99284 EMERGENCY DEPT VISIT MOD MDM: CPT

## 2020-10-05 PROCEDURE — 73630 X-RAY EXAM OF FOOT: CPT

## 2020-10-05 NOTE — LETTER
[facility]  [address] [facility]  Phone: [phone]             October 5, 2020    Patient: Debbi Soares   YOB: 1960   Date of Visit: 10/5/2020       To Whom It May Concern:    Debbi Soares was seen and treated in our emergency department on 10/5/2020.        Sincerely,             Signature:__________________________________

## 2020-10-05 NOTE — ED TRIAGE NOTES
Patient states she \"hurt her big toe after kicking the dog. \" Patient states there is no pain at rest just with walking. She has not taken anything at home for the pain but has been putting ice on the toe.

## 2020-10-05 NOTE — ED PROVIDER NOTES
[facility]  [address] [facility]  Phone: [phone]  [place]      Pt Name: Ana Rosa Crane  MRN: [id number]  [place] 1960  Date of evaluation: 10/5/2020    CHIEF COMPLAINT       Chief Complaint   Patient presents with    Toe Pain     right great toe       HISTORY OF PRESENT ILLNESS    Ana Rosa Crane is a 63 y.o. female who presents to the emergency department complaining of right big toe pain. She injured it after kicking her dog yesterday. Pain worse with ambulation. Denies paresthesias or weakness no other symptoms. REVIEW OF SYSTEMS       Constitutional: No fevers or chills   Musculoskeletal: Right big toe pain  Skin: No rash Right lower extremity  Neurological: No paresthesias or weakness right lower extremity    PAST MEDICAL HISTORY    has a past medical history of Cervical dysplasia, Factor 5 Leiden mutation, heterozygous ([address] Influenza B, Leaky heart valve, and Pneumonia. SURGICAL HISTORY      has a past surgical history that includes Tubal ligation; Knee arthroscopy; Tonsillectomy (1964); Cholecystectomy (02/28/2018); and pr lap,cholecystectomy (N/A, 2/28/2018). CURRENT MEDICATIONS       Previous Medications    No medications on file       ALLERGIES     has No Known Allergies. FAMILY HISTORY     has no family status information on file. family history is not on file. SOCIAL HISTORY      reports that she has been smoking cigarettes. She has been smoking about 1.00 pack per day. She has never used smokeless tobacco. She reports that she does not drink alcohol or use drugs.     PHYSICAL EXAM       ED Triage Vitals [10/05/20 0954]   BP Temp Temp Source Pulse Resp SpO2 Height Weight   (!) 167/69 98.3 °F (36.8 °C) Oral 59 20 100 % 6' 3\" (1.905 m) 140 lb (63.5 kg)       Constitutional: Alert, oriented x3, nontoxic, answering questions appropriately, acting properly for age, in no acute distress   HEENT: Extraocular muscles intact,  Neck: Trachea midline  Musculoskeletal: Right lower extremity no pain at the knee or ankle. Pedal pulses intact and capillary refill less than 2 seconds. Tenderness over the proximal phalanx of the right first digit no ecchymosis or swelling. Decreased range of motion. Neurologic: Right lower extremity motor and sensory intact. Skin: Warm and dry         DIFFERENTIAL DIAGNOSIS/ MDM:     X-ray right foot. DIAGNOSTIC RESULTS     EKG: All EKG's are interpreted by the Emergency Department Physician who either signs or Co-signs this chart in the absence of a cardiologist.        Not indicated unless otherwise documented above    LABS:  No results found for this visit on 10/05/20. Not indicated unless otherwise documented above    RADIOLOGY:   I reviewed the radiologist interpretations:    XR FOOT RIGHT (MIN 3 VIEWS)   Final Result   No acute osseous or soft tissue abnormality. Not indicated unless otherwise documented above    EMERGENCY DEPARTMENT COURSE:     The patient was given the following medications:  No orders of the defined types were placed in this encounter. Vitals:   -------------------------  BP (!) 167/69   Pulse 59   Temp 98.3 °F (36.8 °C) (Oral)   Resp 20   Ht 6' 3\" (1.905 m)   Wt 63.5 kg (140 lb)   SpO2 100%   BMI 17.50 kg/m²     X-ray unremarkable for fracture. Supportive care. Motrin or Tylenol for pain. Ice as needed. Return if worsening symptoms or other concerns. Follow-up with family physician for reevaluation. I have reviewed the disposition diagnosis with the patient and or their family/guardian. I have answered their questions and given discharge instructions. They voiced understanding of these instructions and did not have any furtherquestions or complaints. CRITICAL CARE:    None    CONSULTS:    None    PROCEDURES:    None      OARRS Report if indicated             FINAL IMPRESSION      1.  Contusion of right great toe without damage to nail, initial encounter          DISPOSITION/PLAN   DISPOSITION          CONDITION ON DISPOSITION: STABLE       PATIENT REFERRED TO:  Sol Swift MD  [address] [address] [phone]    Schedule an appointment as soon as possible for a visit in 1 week        DISCHARGE MEDICATIONS:  New Prescriptions    No medications on file       (Please note that portions of thisnote were completed with a voice recognition program.  Efforts were made to edit the dictations but occasionally words are mis-transcribed.)    Alberto Miller, DO  Attending Emergency Physician       Alberto Miller,   10/05/20 1025

## 2021-11-03 ENCOUNTER — HOSPITAL ENCOUNTER (EMERGENCY)
Facility: CLINIC | Age: 61
Discharge: HOME | End: 2021-11-03
Attending: EMERGENCY MEDICINE
Payer: COMMERCIAL

## 2021-11-03 VITALS
BODY MASS INDEX: 22.35 KG/M2 | OXYGEN SATURATION: 100 % | SYSTOLIC BLOOD PRESSURE: 148 MMHG | HEIGHT: 72 IN | WEIGHT: 165 LBS | DIASTOLIC BLOOD PRESSURE: 82 MMHG | RESPIRATION RATE: 16 BRPM | TEMPERATURE: 97.7 F | HEART RATE: 56 BPM

## 2021-11-03 DIAGNOSIS — B02.9 HERPES ZOSTER WITHOUT COMPLICATION: Primary | ICD-10-CM

## 2021-11-03 PROCEDURE — 99283 EMERGENCY DEPT VISIT LOW MDM: CPT

## 2021-11-03 RX ORDER — ACYCLOVIR 800 MG/1
800 TABLET ORAL
Qty: 50 TABLET | Refills: 0 | Status: SHIPPED | OUTPATIENT
Start: 2021-11-03 | End: 2021-11-13

## 2021-11-03 NOTE — ED PROVIDER NOTES
VITALS:  height is 6' 0.25\" (1.835 m) and weight is 74.8 kg (165 lb). Her temperature is 97.7 °F (36.5 °C). Her blood pressure is 148/82 (abnormal) and her pulse is 56. Her respiration is 16 and oxygen saturation is 100%. The patient is alert and oriented, in no apparent distress. HEENT is atraumatic. Pupils are PERRL at 4 mm. Mucous membranes moist.    Neck is supple. Heart sounds regular rate and rhythm. Lungs clear, no wheezes, rales or rhonchi. Abdomen: soft, nontender with no pain to palpation. Musculoskeletal exam shows no evidence of trauma. Skin: Scattered lesions somewhat vesicular and occasionally scabbed in distribution along the left flank and back consistent with zoster. Neurological exam reveals cranial nerves 2 through 12 grossly intact. Patient has equal  and normal deep tendon reflexes. Psychiatric: Appropriate  Lymphatics.:  No lymphadenopathy. DIFFERENTIAL DIAGNOSIS/ MDM:     Zoster, infection    DIAGNOSTIC RESULTS         EMERGENCY DEPARTMENT COURSE:   Vitals:    Vitals:    11/03/21 0757 11/03/21 0800 11/03/21 0803   BP:   (!) 148/82   Pulse: 56     Resp: 16     Temp:   97.7 °F (36.5 °C)   SpO2: 100%     Weight:  74.8 kg (165 lb)    Height:  6' 0.25\" (1.835 m)      -------------------------  BP: (!) 148/82, Temp: 97.7 °F (36.5 °C), Pulse: 56, Resp: 16      Re-evaluation Notes    I will prescribe acyclovir. The patient may follow-up with her doctor. She knows to avoid contact with vulnerable populations. She is discharged in good condition. FINAL IMPRESSION      1.  Herpes zoster without complication          DISPOSITION/PLAN   DISPOSITION Decision To Discharge 11/03/2021 08:01:07 AM      Condition on Disposition    good    PATIENT REFERRED TO:  Мария Petersen MD  [address] [place] [address]  [phone]    In 1 week  As needed      DISCHARGE MEDICATIONS:  New Prescriptions    ACYCLOVIR (ZOVIRAX) 800 MG TABLET    Take 1 tablet by mouth 5 times daily for 10 days       (Please note that portions of this note were completed with a voice recognition program.  Efforts were made to edit the dictations but occasionally words are mis-transcribed.)    Thania Kunz MD,, MD   Attending Emergency Physician       Eric Hong MD  11/03/21 6893

## 2021-11-08 ENCOUNTER — HOSPITAL ENCOUNTER (EMERGENCY)
Facility: CLINIC | Age: 61
Discharge: HOME | End: 2021-11-08
Attending: EMERGENCY MEDICINE
Payer: COMMERCIAL

## 2021-11-08 VITALS
HEIGHT: 72 IN | BODY MASS INDEX: 21.67 KG/M2 | TEMPERATURE: 97.7 F | WEIGHT: 160 LBS | DIASTOLIC BLOOD PRESSURE: 86 MMHG | SYSTOLIC BLOOD PRESSURE: 159 MMHG | RESPIRATION RATE: 16 BRPM | HEART RATE: 60 BPM | OXYGEN SATURATION: 97 %

## 2021-11-08 DIAGNOSIS — B02.9 HERPES ZOSTER WITHOUT COMPLICATION: Primary | ICD-10-CM

## 2021-11-08 PROCEDURE — 6370000000 HC RX 637 (ALT 250 FOR IP): Performed by: EMERGENCY MEDICINE

## 2021-11-08 PROCEDURE — 99284 EMERGENCY DEPT VISIT MOD MDM: CPT

## 2021-11-08 RX ORDER — OXYCODONE HYDROCHLORIDE AND ACETAMINOPHEN 5; 325 MG/1; MG/1
1-2 TABLET ORAL EVERY 6 HOURS PRN
Qty: 16 TABLET | Refills: 0 | Status: SHIPPED | OUTPATIENT
Start: 2021-11-08 | End: 2021-11-13

## 2021-11-08 RX ORDER — OXYCODONE HYDROCHLORIDE AND ACETAMINOPHEN 5; 325 MG/1; MG/1
2 TABLET ORAL ONCE
Status: COMPLETED | OUTPATIENT
Start: 2021-11-08 | End: 2021-11-08

## 2021-11-08 RX ADMIN — OXYCODONE AND ACETAMINOPHEN 2 TABLET: 5; 325 TABLET ORAL at 07:00

## 2021-11-08 ASSESSMENT — PAIN DESCRIPTION - LOCATION: LOCATION: FLANK

## 2021-11-08 ASSESSMENT — ENCOUNTER SYMPTOMS
SHORTNESS OF BREATH: 0
DIARRHEA: 0
EYE PAIN: 0
NAUSEA: 0
RHINORRHEA: 0
BACK PAIN: 0
VOMITING: 0
COUGH: 0
ABDOMINAL PAIN: 0
SORE THROAT: 0

## 2021-11-08 ASSESSMENT — PAIN SCALES - GENERAL: PAINLEVEL_OUTOF10: 8

## 2021-11-08 ASSESSMENT — PAIN DESCRIPTION - ORIENTATION: ORIENTATION: LEFT

## 2021-11-08 NOTE — ED PROVIDER NOTES
[facility]  [facility]  Phone: [phone]    [place]          Pt Name: Priscilla Alexandre  MRN: [id number]  [place] 1960  Date of evaluation: 11/8/2021      CHIEF COMPLAINT       Chief Complaint   Patient presents with    Rash       HISTORY OF PRESENT ILLNESS       Priscilla Alexandre is a 57 y.o. female who presents with pain from shingles. Was seen here about 5 days ago and started on acyclovir. States she is still having pain but the rash seems to be improving. No open sores. Nothing otherwise makes it better or worse. Denies other symptoms or concerns. REVIEW OF SYSTEMS       Review of Systems   Constitutional: Negative for chills, fatigue and fever. HENT: Negative for rhinorrhea and sore throat. Eyes: Negative for pain. Respiratory: Negative for cough and shortness of breath. Cardiovascular: Negative for chest pain. Gastrointestinal: Negative for abdominal pain, diarrhea, nausea and vomiting. Genitourinary: Negative for difficulty urinating. Musculoskeletal: Negative for back pain and neck pain. Skin: Positive for rash. Negative for wound. Neurological: Negative for weakness and headaches. PAST MEDICAL HISTORY    has a past medical history of Cervical dysplasia, Factor 5 Leiden mutation, heterozygous ([facility] [address] Influenza B, Leaky heart valve, and Pneumonia. SURGICAL HISTORY      has a past surgical history that includes Tubal ligation; Knee arthroscopy; Tonsillectomy (1964); Cholecystectomy (02/28/2018); and pr lap,cholecystectomy (N/A, 2/28/2018). CURRENT MEDICATIONS       Previous Medications    ACYCLOVIR (ZOVIRAX) 800 MG TABLET    Take 1 tablet by mouth 5 times daily for 10 days       ALLERGIES     has No Known Allergies. FAMILY HISTORY     has no family status information on file. family history is not on file. SOCIAL HISTORY      reports that she has been smoking cigarettes.  She has been smoking about 1.00 pack per day. She has never used smokeless tobacco. She reports that she does not drink alcohol and does not use drugs. PHYSICAL EXAM     INITIAL VITALS:  height is 6' 3\" (1.905 m) and weight is 72.6 kg (160 lb). Her oral temperature is 97.7 °F (36.5 °C). Her blood pressure is 159/86 (abnormal) and her pulse is 60. Her respiration is 16 and oxygen saturation is 97%. Physical Exam  Vitals reviewed. Constitutional:       General: She is not in acute distress. Appearance: She is well-developed. She is not ill-appearing or toxic-appearing. HENT:      Head: Normocephalic and atraumatic. Right Ear: External ear normal.      Left Ear: External ear normal.   Eyes:      General: Lids are normal.   Neck:      Trachea: No tracheal deviation. Cardiovascular:      Rate and Rhythm: Normal rate and regular rhythm. Pulmonary:      Effort: Pulmonary effort is normal. No respiratory distress. Breath sounds: Normal breath sounds. Abdominal:      Palpations: Abdomen is soft. Tenderness: There is no abdominal tenderness. Skin:     General: Skin is warm and dry. Comments: Few areas of erythematous zoster-like rash to the left mid back as shown in the picture. No open lesions noted. It is in a dermatomal fashion. Otherwise unremarkable skin exam.   Neurological:      Mental Status: She is alert. GCS: GCS eye subscore is 4. GCS verbal subscore is 5. GCS motor subscore is 6. Psychiatric:         Speech: Speech normal.           DIFFERENTIAL DIAGNOSIS/ MDM:     Plan will be to prescribe some Percocet for the patient to help with her discomfort. That is her primary concern is that it is still very uncomfortable. OARRS report reviewed and negative. She was advised to follow-up with her PCP return right away if worsening or for new or concerning symptoms. She is comfortable with the plan.     The patient understands that at this time there is no evidence for a more malignant underlying process, but the patient also understands that early in the process of an illness or injury, an emergency department workup can be falsely reassuring. Routine discharge counseling was given, and the patient understands that worsening, changing or persistent symptoms should prompt an immediate call or follow up with their primary physician or return to the emergency department. The importance of appropriate follow up was also discussed. I have reviewed the disposition diagnosis with the patient and or their family/guardian. I have answered their questions and given discharge instructions. They voiced understanding of these instructions and did not have any further questions or complaints. DIAGNOSTIC RESULTS     EKG: All EKG's are interpreted by the Emergency Department Physician who either signs or Co-signs this chart in the absence of a cardiologist.        RADIOLOGY:   Interpretation per the Radiologist below, if available at the time of this note:  No orders to display       No results found. LABS:  No results found for this visit on 11/08/21. EMERGENCY DEPARTMENT COURSE:     The patient was given the following medications:  Orders Placed This Encounter   Medications    oxyCODONE-acetaminophen (PERCOCET) 5-325 MG per tablet 2 tablet    oxyCODONE-acetaminophen (PERCOCET) 5-325 MG per tablet     Sig: Take 1-2 tablets by mouth every 6 hours as needed for Pain for up to 5 days. WARNING:  May cause drowsiness. May impair ability to operate vehicles or machinery. Do not use in combination with alcohol.      Dispense:  16 tablet     Refill:  0        Vitals:    Vitals:    11/08/21 0640   BP: (!) 159/86   Pulse: 60   Resp: 16   Temp: 97.7 °F (36.5 °C)   TempSrc: Oral   SpO2: 97%   Weight: 72.6 kg (160 lb)   Height: 6' 3\" (1.905 m)     -------------------------  BP: (!) 159/86, Temp: 97.7 °F (36.5 °C), Pulse: 60, Resp: 16      CONSULTS:    None    CRITICAL CARE: None    PROCEDURES:    None    FINAL IMPRESSION      1. Herpes zoster without complication          DISPOSITION/PLAN   DISPOSITION Decision To Discharge 11/08/2021 06:51:51 AM      Condition on Disposition    Improved    PATIENT REFERRED TO:  No follow-up provider specified. DISCHARGE MEDICATIONS:  New Prescriptions    OXYCODONE-ACETAMINOPHEN (PERCOCET) 5-325 MG PER TABLET    Take 1-2 tablets by mouth every 6 hours as needed for Pain for up to 5 days. WARNING:  May cause drowsiness. May impair ability to operate vehicles or machinery. Do not use in combination with alcohol.        (Please note that portions of this note were completed with a voice recognition program.  Efforts were made to edit the dictations but occasionally words are mis-transcribed.)    Sally Gonzalez DO, DO  Attending Emergency Physician       Sally Gonzalez DO  11/08/21 0701

## 2021-11-08 NOTE — ED TRIAGE NOTES
Patient was seen here on 11/03/2021 with dx of shingles. Patient continues to have pain. patient unable to work.

## 2022-12-23 ENCOUNTER — APPOINTMENT (OUTPATIENT)
Dept: GENERAL RADIOLOGY | Age: 62
End: 2022-12-23
Payer: COMMERCIAL

## 2022-12-23 ENCOUNTER — HOSPITAL ENCOUNTER (EMERGENCY)
Age: 62
Discharge: HOME | End: 2022-12-23
Attending: EMERGENCY MEDICINE
Payer: COMMERCIAL

## 2022-12-23 ENCOUNTER — APPOINTMENT (OUTPATIENT)
Dept: CT IMAGING | Age: 62
End: 2022-12-23
Payer: COMMERCIAL

## 2022-12-23 VITALS
RESPIRATION RATE: 16 BRPM | DIASTOLIC BLOOD PRESSURE: 49 MMHG | HEART RATE: 79 BPM | TEMPERATURE: 98.2 F | SYSTOLIC BLOOD PRESSURE: 107 MMHG | BODY MASS INDEX: 21.67 KG/M2 | WEIGHT: 160 LBS | HEIGHT: 72 IN | OXYGEN SATURATION: 99 %

## 2022-12-23 DIAGNOSIS — S52.502A CLOSED FRACTURE OF DISTAL END OF LEFT RADIUS, UNSPECIFIED FRACTURE MORPHOLOGY, INITIAL ENCOUNTER: ICD-10-CM

## 2022-12-23 DIAGNOSIS — S52.501A CLOSED FRACTURE OF DISTAL END OF RIGHT RADIUS, UNSPECIFIED FRACTURE MORPHOLOGY, INITIAL ENCOUNTER: Primary | ICD-10-CM

## 2022-12-23 LAB — VITAMIN D 25-HYDROXY: 31 NG/ML

## 2022-12-23 PROCEDURE — 73110 X-RAY EXAM OF WRIST: CPT

## 2022-12-23 PROCEDURE — 25675 CLTX DSTL RAD/ULN DISLC MNPJ: CPT

## 2022-12-23 PROCEDURE — 73130 X-RAY EXAM OF HAND: CPT

## 2022-12-23 PROCEDURE — 99284 EMERGENCY DEPT VISIT MOD MDM: CPT

## 2022-12-23 PROCEDURE — 82306 VITAMIN D 25 HYDROXY: CPT

## 2022-12-23 PROCEDURE — 73100 X-RAY EXAM OF WRIST: CPT

## 2022-12-23 PROCEDURE — 73090 X-RAY EXAM OF FOREARM: CPT

## 2022-12-23 PROCEDURE — 2500000003 HC RX 250 WO HCPCS

## 2022-12-23 PROCEDURE — 73200 CT UPPER EXTREMITY W/O DYE: CPT

## 2022-12-23 PROCEDURE — 6370000000 HC RX 637 (ALT 250 FOR IP): Performed by: STUDENT IN AN ORGANIZED HEALTH CARE EDUCATION/TRAINING PROGRAM

## 2022-12-23 RX ORDER — IBUPROFEN 800 MG/1
800 TABLET ORAL ONCE
Status: COMPLETED | OUTPATIENT
Start: 2022-12-23 | End: 2022-12-23

## 2022-12-23 RX ORDER — LIDOCAINE HYDROCHLORIDE 10 MG/ML
20 INJECTION, SOLUTION INFILTRATION; PERINEURAL ONCE
Status: COMPLETED | OUTPATIENT
Start: 2022-12-23 | End: 2022-12-23

## 2022-12-23 RX ORDER — OXYCODONE HYDROCHLORIDE 5 MG/1
5 TABLET ORAL EVERY 6 HOURS PRN
Qty: 12 TABLET | Refills: 0 | Status: ON HOLD | OUTPATIENT
Start: 2022-12-23 | End: 2022-12-26 | Stop reason: HOSPADM

## 2022-12-23 RX ORDER — ACETAMINOPHEN 500 MG
1000 TABLET ORAL ONCE
Status: COMPLETED | OUTPATIENT
Start: 2022-12-23 | End: 2022-12-23

## 2022-12-23 RX ADMIN — LIDOCAINE HYDROCHLORIDE 20 ML: 10 INJECTION, SOLUTION INFILTRATION; PERINEURAL at 13:55

## 2022-12-23 RX ADMIN — IBUPROFEN 800 MG: 800 TABLET, FILM COATED ORAL at 12:37

## 2022-12-23 RX ADMIN — ACETAMINOPHEN 1000 MG: 500 TABLET ORAL at 12:36

## 2022-12-23 ASSESSMENT — PAIN - FUNCTIONAL ASSESSMENT: PAIN_FUNCTIONAL_ASSESSMENT: 0-10

## 2022-12-23 ASSESSMENT — ENCOUNTER SYMPTOMS
NAUSEA: 0
SHORTNESS OF BREATH: 0
COUGH: 0
ABDOMINAL PAIN: 0
VOMITING: 0

## 2022-12-23 ASSESSMENT — PAIN SCALES - GENERAL
PAINLEVEL_OUTOF10: 7

## 2022-12-23 NOTE — ED PROVIDER NOTES
[address] [facility]     Emergency Department     Faculty Note/ Attestation      Pt Name: Juan Carlos Noriega                                       MRN: [id number]  [place] 1960  Date of evaluation: 12/23/2022    Patients PCP:    Connie Walker MD    Attestation  I performed a history and physical examination of the patient and discussed management with the resident. I reviewed the residents note and agree with the documented findings and plan of care. Any areas of disagreement are noted on the chart. I was personally present for the key portions of any procedures. I have documented in the chart those procedures where I was not present during the key portions. I have reviewed the emergency nurses triage note. I agree with the chief complaint, past medical history, past surgical history, allergies, medications, social and family history as documented unless otherwise noted below. For Physician Assistant/ Nurse Practitioner cases/documentation I have personally evaluated this patient and have completed at least one if not all key elements of the E/M (history, physical exam, and MDM). Additional findings are as noted. 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  Pain in L limits activities he was not able to use the left arm or drive due to the pain patient in the right arm also still having pain patient is right-hand dominant.   Patient denies hitting head takes no blood thinners no loss conscious no neck pain no stiffness no headaches vision changes        EMERGENCY DEPARTMENT COURSE:     -------------------------  BP: (!) 107/49, Temp: 98.2 °F (36.8 °C), Heart Rate: 79, Resp: 16  Physical Exam __  Constitutional:  oriented to person, place, and time. appears well-developed and well-nourished. HENT: no facial swelling or edema  Head: Normocephalic and atraumatic. Eyes: Right eye exhibits no discharge. Left eye exhibits no discharge. No scleral icterus. Neck: No JVD present. No tracheal deviation present. Cardiovascular: pulses present in extremities  Pulmonary/Chest: Effort normal. No respiratory distress. Musculoskeletal: Patient has swelling and deformity of the left wrist patient's right wrist tender but not deformed. Pulse motor sensory intact in both upper extremities  Neurological: they are alert and oriented to person, place, and time. Skin: Skin is warm and dry. Psychiatric: has a normal mood and affect. behavior is normal.      Comments  Imaging will need to be obtained of the forearms and wrists patient left arm may need reduction patient currently declining any stronger medication than ibuprofen or Tylenol    The pt declining any IV pain medications has noticeable fractures Othopedics consulted for care and follow up. Porfirio Roach DO,, , RDMS.   Attending Emergency Physician          Porfirio Roach DO  12/23/22 5896

## 2022-12-23 NOTE — Clinical Note
Sarah Lackey was seen and treated in our emergency department on 12/23/2022.     She should remain out of work until she is able to follow-up with her orthopedic surgeon    Livan Greenberg DO

## 2022-12-23 NOTE — ED PROVIDER NOTES
[facility] ED  Emergency Department  Emergency Medicine Resident Sign-out     Care of Peewee Lees was assumed from Dr. Sudha Tolbert and is being seen for Wrist Injury  . The patient's initial evaluation and plan have been discussed with the prior provider who initially evaluated the patient. EMERGENCY DEPARTMENT COURSE / MEDICAL DECISION MAKING:       MEDICATIONS GIVEN:  Orders Placed This Encounter   Medications    acetaminophen (TYLENOL) tablet 1,000 mg    ibuprofen (ADVIL;MOTRIN) tablet 800 mg    lidocaine 1 % injection 20 mL    oxyCODONE (ROXICODONE) 5 MG immediate release tablet     Sig: Take 1 tablet by mouth every 6 hours as needed for Pain for up to 3 days. Max Daily Amount: 20 mg     Dispense:  12 tablet     Refill:  0       LABS / RADIOLOGY:     Labs Reviewed   VITAMIN D 25 HYDROXY       CT WRIST LEFT WO CONTRAST   Final Result   Acute comminuted mildly displaced intra-articular distal radius fracture. XR WRIST LEFT (2 VIEWS)   Final Result   Successful reduction of comminuted intra-articular mildly displaced fracture   of distal radial epiphysis. There is persistent mild dorsal impaction. RECOMMENDATION:         XR WRIST LEFT (2 VIEWS)   Final Result   Successful reduction of comminuted intra-articular mildly displaced fracture   of distal radial epiphysis. There is persistent mild dorsal impaction. RECOMMENDATION:         XR RADIUS ULNA RIGHT (2 VIEWS)   Final Result   Right forearm:      1. Acute impacted metaphyseal distal radius fracture. 2. Mild underlying osteoarthrosis. 3. Soft tissue swelling about the right wrist.   Right wrist:      1. Suspected nondisplaced distal radius metaphyseal fracture. 2. Mild underlying osteoarthrosis. Soft tissue swelling about the right   wrist.   Right hand:      1. Suspected nondisplaced distal radius metaphyseal fracture. 2. Mild underlying osteoarthrosis. 3. Right wrist soft tissue swelling.    Left forearm: 1. Acute comminuted impacted distal radius fracture. 2. Soft tissue swelling about the left wrist.   Left wrist:      1. Acute comminuted intra-articular distal radius fracture with impaction. 2. Moderate 1st CMC joint osteoarthrosis. 3. Mild soft tissue swelling about the left wrist.   Left hand:      1. Acute comminuted intra-articular distal radius fracture. 2. Moderate 1st CMC joint osteoarthrosis. CPPD. 3. Soft tissue swelling about the left wrist.         XR WRIST RIGHT (MIN 3 VIEWS)   Final Result   Right forearm:      1. Acute impacted metaphyseal distal radius fracture. 2. Mild underlying osteoarthrosis. 3. Soft tissue swelling about the right wrist.   Right wrist:      1. Suspected nondisplaced distal radius metaphyseal fracture. 2. Mild underlying osteoarthrosis. Soft tissue swelling about the right   wrist.   Right hand:      1. Suspected nondisplaced distal radius metaphyseal fracture. 2. Mild underlying osteoarthrosis. 3. Right wrist soft tissue swelling. Left forearm:      1. Acute comminuted impacted distal radius fracture. 2. Soft tissue swelling about the left wrist.   Left wrist:      1. Acute comminuted intra-articular distal radius fracture with impaction. 2. Moderate 1st CMC joint osteoarthrosis. 3. Mild soft tissue swelling about the left wrist.   Left hand:      1. Acute comminuted intra-articular distal radius fracture. 2. Moderate 1st CMC joint osteoarthrosis. CPPD. 3. Soft tissue swelling about the left wrist.         XR HAND RIGHT (MIN 3 VIEWS)   Final Result   Right forearm:      1. Acute impacted metaphyseal distal radius fracture. 2. Mild underlying osteoarthrosis. 3. Soft tissue swelling about the right wrist.   Right wrist:      1. Suspected nondisplaced distal radius metaphyseal fracture. 2. Mild underlying osteoarthrosis. Soft tissue swelling about the right   wrist.   Right hand:      1.  Suspected nondisplaced distal radius metaphyseal fracture. 2. Mild underlying osteoarthrosis. 3. Right wrist soft tissue swelling. Left forearm:      1. Acute comminuted impacted distal radius fracture. 2. Soft tissue swelling about the left wrist.   Left wrist:      1. Acute comminuted intra-articular distal radius fracture with impaction. 2. Moderate 1st CMC joint osteoarthrosis. 3. Mild soft tissue swelling about the left wrist.   Left hand:      1. Acute comminuted intra-articular distal radius fracture. 2. Moderate 1st CMC joint osteoarthrosis. CPPD. 3. Soft tissue swelling about the left wrist.         XR HAND LEFT (MIN 3 VIEWS)   Final Result   Right forearm:      1. Acute impacted metaphyseal distal radius fracture. 2. Mild underlying osteoarthrosis. 3. Soft tissue swelling about the right wrist.   Right wrist:      1. Suspected nondisplaced distal radius metaphyseal fracture. 2. Mild underlying osteoarthrosis. Soft tissue swelling about the right   wrist.   Right hand:      1. Suspected nondisplaced distal radius metaphyseal fracture. 2. Mild underlying osteoarthrosis. 3. Right wrist soft tissue swelling. Left forearm:      1. Acute comminuted impacted distal radius fracture. 2. Soft tissue swelling about the left wrist.   Left wrist:      1. Acute comminuted intra-articular distal radius fracture with impaction. 2. Moderate 1st CMC joint osteoarthrosis. 3. Mild soft tissue swelling about the left wrist.   Left hand:      1. Acute comminuted intra-articular distal radius fracture. 2. Moderate 1st CMC joint osteoarthrosis. CPPD. 3. Soft tissue swelling about the left wrist.         XR WRIST LEFT (MIN 3 VIEWS)   Final Result   Right forearm:      1. Acute impacted metaphyseal distal radius fracture. 2. Mild underlying osteoarthrosis. 3. Soft tissue swelling about the right wrist.   Right wrist:      1. Suspected nondisplaced distal radius metaphyseal fracture. 2. Mild underlying osteoarthrosis. Soft tissue swelling about the right   wrist.   Right hand:      1. Suspected nondisplaced distal radius metaphyseal fracture. 2. Mild underlying osteoarthrosis. 3. Right wrist soft tissue swelling. Left forearm:      1. Acute comminuted impacted distal radius fracture. 2. Soft tissue swelling about the left wrist.   Left wrist:      1. Acute comminuted intra-articular distal radius fracture with impaction. 2. Moderate 1st CMC joint osteoarthrosis. 3. Mild soft tissue swelling about the left wrist.   Left hand:      1. Acute comminuted intra-articular distal radius fracture. 2. Moderate 1st CMC joint osteoarthrosis. CPPD. 3. Soft tissue swelling about the left wrist.         XR RADIUS ULNA LEFT (2 VIEWS)   Final Result   Right forearm:      1. Acute impacted metaphyseal distal radius fracture. 2. Mild underlying osteoarthrosis. 3. Soft tissue swelling about the right wrist.   Right wrist:      1. Suspected nondisplaced distal radius metaphyseal fracture. 2. Mild underlying osteoarthrosis. Soft tissue swelling about the right   wrist.   Right hand:      1. Suspected nondisplaced distal radius metaphyseal fracture. 2. Mild underlying osteoarthrosis. 3. Right wrist soft tissue swelling. Left forearm:      1. Acute comminuted impacted distal radius fracture. 2. Soft tissue swelling about the left wrist.   Left wrist:      1. Acute comminuted intra-articular distal radius fracture with impaction. 2. Moderate 1st CMC joint osteoarthrosis. 3. Mild soft tissue swelling about the left wrist.   Left hand:      1. Acute comminuted intra-articular distal radius fracture. 2. Moderate 1st CMC joint osteoarthrosis. CPPD. 3. Soft tissue swelling about the left wrist.             RECENT VITALS:     Temp: 98.2 °F (36.8 °C),  Heart Rate: 79, Resp: 16, BP: (!) 107/49, SpO2: 99 %    This patient is a 60 y.o. Female with fall resulting in bilateral radial fractures.   Patient was seen by orthopedic surgery. Left radius was reduced after hematoma block. Splint placed. Right wrist placed in cock-up splint. Workmen's Comp. paperwork filled out. Awaiting orthopedic surgery's final recommendations prior to discharge. Updated after discharge instructions placed that orthopedic surgery now wants a CT performed. Patient was updated on this plan. Anxious to leave. CT performed, spoke with orthopedic surgery who is okay with discharging after CT complete. Discharged in stable condition peer    OUTSTANDING TASKS / RECOMMENDATIONS:      Await ortho discharge infor     FINAL IMPRESSION:     1. Closed fracture of distal end of right radius, unspecified fracture morphology, initial encounter    2. Closed fracture of distal end of left radius, unspecified fracture morphology, initial encounter        DISPOSITION:       DISPOSITION:  [x]  Discharge   []  Transfer -    []  Admission -     []  Against Medical Advice   []  Eloped   FOLLOW-UP: Ese Benitez MD  [address] [facility]  [phone]    Schedule an appointment as soon as possible for a visit       [facility]  [address] [facility]  [phone]    If symptoms worsen    Yamila Ruelas DO  [address] [facility] [address] [facility]  [phone]    Schedule an appointment as soon as possible for a visit      DISCHARGE MEDICATIONS: Discharge Medication List as of 12/23/2022  5:50 PM        START taking these medications    Details   oxyCODONE (ROXICODONE) 5 MG immediate release tablet Take 1 tablet by mouth every 6 hours as needed for Pain for up to 3 days.  Max Daily Amount: 20 mg, Disp-12 tablet, R-0Print                Joel Marks DO  Emergency Medicine Resident  [facility]       Jeol Marks [place]  Resident  12/24/22 [id number]

## 2022-12-23 NOTE — CONSULTS
Orthopedic Surgery Consult  (Dr. Brady Cho)      CC/Reason for consult:  Bilateral wrist pain    HPI:      The patient is a 60 y.o. RHD female who sustained a fall from standing height when trying to open the door and the door handle broke causing her to fall back with both hands behind her. Patient had immediate pain though was able to ambulate for fall. Patient drove her self to the ED for evaluation. Patient denies hitting her head or loss consciousness. Patient denies numbness, tingling, or weakness. Patient has no other orthopedic complaints this time. No chemical AC, [employer] and works as a , patient is a smoker with 1 pack/day    Prior orthopedic injuries/surgeries: Patient reported prior stress fracture was managed operatively through left leg. Medical history: Factor V Leiden mutation and has not taken anticoagulation per report from patient. Remaining history listed below    Past Medical History  Melodie Rose has a past medical history of Cervical dysplasia, Factor 5 Leiden mutation, heterozygous ([facility] [address] Influenza B, and Pneumonia. Past Surgical History  Melodie Rose has a past surgical history that includes Tubal ligation; Knee arthroscopy; Tonsillectomy (1964); Cholecystectomy (02/28/2018); and pr lap,cholecystectomy (N/A, 2/28/2018). Current Medications  Reviewed. See EMR for details. Allergies  Allergies reviewed: Patient has no known allergies. Social History  Patient reports that she has been smoking cigarettes. She has been smoking an average of 1 pack per day. She has never used smokeless tobacco. She reports that she does not drink alcohol and does not use drugs. Family History  Patient family history is not on file. ROS:   General: Afebrile, no chills. CV: No chest pain. Resp: No SOB.   MSK: Bilateral wrist pain  Neuro: No numbness, tingling, weakness  10 point ROS negative other than stated above    PE:  Blood pressure (!) 107/49, pulse 79, temperature 98.2 °F (36.8 °C), temperature source Oral, resp. rate 16, height 6' 3.25\" (1.911 m), weight 160 lb (72.6 kg), SpO2 99 %. Gen: Alert and oriented, NAD, cooperative. Head: Normocephalic, atraumatic. Cardiovascular: Regular rate. Respiratory: Chest symmetric, no accessory muscle use. Pelvis: Stable to anterior and lateral compression. RUE: Skin intact with no obvious swelling or ecchymosis. Clubbing noted of the digits. No ecchymoses, abrasion, deformity, or lacerations. Moderately tender to palpation over the wrist.  No crepitus. Compartments soft and easily compressible. Ulnar/median/AIN/PIN/radial motor intact. Axillary/MCN/median/ulnar/radial nerves SILT. Radial pulse 2+ with BCR.    LUE: Skin intact without obvious deformity ecchymoses over the distal radius. Clubbing of digits noted. No abrasion or lacerations. Moderately tender to palpation over the level of fracture. Crepitus noted with reduction maneuver. Compartments soft and easily compressible. Ulnar/median/AIN/PIN/radial motor intact. Axillary/MCN/median/ulnar/radial nerves SILT. Radial pulse 2+ with BCR. Labs  No results for input(s): WBC, HGB, HCT, PLT, INR, PTT, NA, K, BUN, CREATININE, GLUCOSE, SEDRATE, CRP in the last 72 hours. Invalid input(s): PT     Imaging   Multiple views of the right wrist demonstrating a transverse fracture of the distal radius there is minimally displaced. Radial examination of volar tilt maintained. No appreciated dissociation of the DRUJ or radiocarpal joint. Multiple views of left wrist demonstrating a comminuted intra-articular fracture with dorsal displacement.      Osteopenic and arthritic changes appreciated bilaterally    Assessment/Plan: 60 y.o. female who fell from standing heigh, being seen for:    -Bilateral distal radius fractures    -Hematoma block, reduce, and splint left wrist  -Wrist brace on right arm  -Please maintain splint and brace  -Plan for OR Monday, 12/26/22  -NWB bilateral upper extremities  -No pushing, pulling, lifting > 5lbs  -Diet OK, NPO Sunday at midnight for OR Monday  -consent/marked  -Follow-up VitD  -Pain control per primary  -Ice and elevate bilateral arms   -OK for discharge and present Monday for OR   -F/u Dr. Leonor Guillen for OR Monday, 12/26/22  -Please contact ortho with any questions    Bishnu Hernandez DO  Orthopedic Surgery Resident  1:26 PM 12/23/2022    Procedure Note:   Hematoma block: Risks, benefits, and alternatives were discussed with the patient, and verbal consent was obtained for hematoma block. 20cc of 1% plain lidocaine was injected into the left wrist fracture hematoma. Once adequate pain control had been achieved, reduction maneuver was performed and fragment position was confirmed on X-ray. A sugar tong splint was applied, and fragment position was again confirmed on X-ray. The patient noticed decrease in pain and denied numbness or tingling in her left hand.

## 2022-12-23 NOTE — ED PROVIDER NOTES
file   Tobacco Use    Smoking status: Every Day     Packs/day: 1.00     Types: Cigarettes    Smokeless tobacco: Never   Vaping Use    Vaping Use: Former   Substance and Sexual Activity    Alcohol use: No    Drug use: No    Sexual activity: Not on file   Other Topics Concern    Not on file   Social History Narrative    Not on file     Social Determinants of Health     Financial Resource Strain: Not on file   Food Insecurity: Not on file   Transportation Needs: Not on file   Physical Activity: Not on file   Stress: Not on file   Social Connections: Not on file   Intimate Partner Violence: Not on file   Housing Stability: Not on file       No family history on file. Allergies:  Patient has no known allergies. Home Medications:  Prior to Admission medications    Medication Sig Start Date End Date Taking? Authorizing Provider   oxyCODONE (ROXICODONE) 5 MG immediate release tablet Take 1 tablet by mouth every 6 hours as needed for Pain for up to 3 days. Max Daily Amount: 20 mg 12/23/22 12/26/22 Yes Leverne Gomes, DO       REVIEW OF SYSTEMS    (2-9 systems for level 4, 10 or more for level 5)      Review of Systems   Constitutional:  Negative for fever. HENT:  Negative for congestion. Respiratory:  Negative for cough and shortness of breath. Cardiovascular:  Negative for chest pain. Gastrointestinal:  Negative for abdominal pain, nausea and vomiting. Genitourinary:  Negative for dysuria. Musculoskeletal:  Positive for arthralgias and joint swelling. Negative for myalgias. Skin:  Negative for rash. Neurological:  Negative for headaches. PHYSICAL EXAM   (up to 7 for level 4, 8 or more for level 5)     INITIAL VITALS:    height is 6' 3.25\" (1.911 m) and weight is 160 lb (72.6 kg). Her oral temperature is 98.2 °F (36.8 °C). Her blood pressure is 107/49 (abnormal) and her pulse is 79. Her respiration is 16 and oxygen saturation is 99%.      Physical Exam  Constitutional:       General: She is not in acute distress. Appearance: Normal appearance. She is not toxic-appearing. HENT:      Head: Normocephalic and atraumatic. Eyes:      Pupils: Pupils are equal, round, and reactive to light. Cardiovascular:      Rate and Rhythm: Normal rate and regular rhythm. Pulses: Normal pulses. Pulmonary:      Effort: Pulmonary effort is normal. No respiratory distress. Breath sounds: Normal breath sounds. No wheezing. Abdominal:      General: Abdomen is flat. Palpations: Abdomen is soft. Tenderness: There is no abdominal tenderness. Musculoskeletal:      Cervical back: Normal range of motion. No tenderness. Comments: Patient does have obvious left wrist deformity as well as pain to palpation over the left dorsal wrist and the left proximal forearm. Patient also have pain to palpation over the right wrist and the right proximal forearm. Radial pulses are equal and intact. Skin:     Comments: Skin is warm, dry, capillary fill less than 2 seconds all 10 fingers. Neurological:      Mental Status: She is alert. Comments: 5/5 hand grasp bilaterally. No sensory deficits in any of the fingers.    Psychiatric:         Mood and Affect: Mood normal.         Behavior: Behavior normal.       DIFFERENTIAL  DIAGNOSIS     PLAN (LABS / IMAGING / EKG):  Orders Placed This Encounter   Procedures    XR RADIUS ULNA RIGHT (2 VIEWS)    XR WRIST RIGHT (MIN 3 VIEWS)    XR HAND RIGHT (MIN 3 VIEWS)    XR HAND LEFT (MIN 3 VIEWS)    XR WRIST LEFT (MIN 3 VIEWS)    XR RADIUS ULNA LEFT (2 VIEWS)    XR WRIST LEFT (2 VIEWS)    XR WRIST LEFT (2 VIEWS)    Vitamin D 25 Hydroxy    Inpatient consult to Orthopedic Surgery       MEDICATIONS ORDERED:  Orders Placed This Encounter   Medications    acetaminophen (TYLENOL) tablet 1,000 mg    ibuprofen (ADVIL;MOTRIN) tablet 800 mg    lidocaine 1 % injection 20 mL    oxyCODONE (ROXICODONE) 5 MG immediate release tablet     Sig: Take 1 tablet by mouth every 6 hours as needed for Pain for up to 3 days. Max Daily Amount: 20 mg     Dispense:  12 tablet     Refill:  0       DDX: Radial fracture, ulnar fracture, musculoskeletal injury    Initial MDM/Plan: 60 y.o. female who presents with bilateral wrist pain after a fall from standing height with her hand stretched out behind her. On examination vitals are unremarkable patient well-appearing speaking in sentences alert and oriented to person, place, time. Examination demonstrates left wrist deformity as well as pain to palpation over this area in the left forearm and pain to palpation in the right wrist however no deformity in the right wrist and there is pain to palpation in the right proximal forearm. Bilateral hands are neurovascularly intact. No signs of head trauma on examination. Patient is [place] head CT negative and does not require CT imaging at this time. Will obtain plain films of the bilateral upper extremities, concern for fracture of the left wrist.    DIAGNOSTIC RESULTS / EMERGENCY DEPARTMENT COURSE / MDM     LABS:  Labs Reviewed   VITAMIN D 25 HYDROXY         RADIOLOGY:  XR RADIUS ULNA LEFT (2 VIEWS)    Result Date: 12/23/2022  EXAMINATION: TWO XRAY VIEWS OF THE LEFT FOREARM; TWO XRAY VIEWS OF THE RIGHT FOREARM; 4 XRAY VIEWS OF THE RIGHT WRIST; THREE XRAY VIEWS OF THE RIGHT HAND; 4 XRAY VIEWS OF THE LEFT WRIST; THREE XRAY VIEWS OF THE LEFT HAND 12/23/2022 12:26 pm COMPARISON: None. HISTORY: ORDERING SYSTEM PROVIDED HISTORY: fall, pain TECHNOLOGIST PROVIDED HISTORY: fall, pain Reason for Exam: fall, ankle pain 25-year-old female with fall and bilateral arm and wrist pain FINDINGS: Right forearm: Acute impacted distal radius metaphyseal fracture. Mild degenerative changes of the triscaphe and 1st CMC joints. Ulna appears intact. Mild soft tissue swelling about the right wrist. Right wrist: Mild degenerative changes of the triscaphe and 1st CMC joints. Suspected nondisplaced distal radius metaphyseal fracture. Scaphoid appears intact mild soft tissue swelling about the right wrist. Right hand: Mild degenerative changes of the distal interphalangeal joints. Mild degenerative changes of the triscaphe and 1st CMC joints. Suspected nondisplaced distal radius metaphyseal fracture. Scaphoid appears intact. Left forearm: Acute comminuted, impacted intra-articular distal radius fracture. Mild soft tissue swelling about the left wrist. Left wrist: Acute comminuted intra-articular distal radius fracture with impaction. Moderate degenerative changes of the 1st CMC and triscaphe joints. Mild soft tissue swelling about the left wrist. Left hand: Acute comminuted impacted intra-articular distal radius fracture. Moderate degenerative changes of the triscaphe and 1st CMC joints. Mild degenerative changes of the interphalangeal joints. Mild soft tissue swelling about the left wrist.  Chondrocalcinosis of the TFCC. Right forearm: 1. Acute impacted metaphyseal distal radius fracture. 2. Mild underlying osteoarthrosis. 3. Soft tissue swelling about the right wrist. Right wrist: 1. Suspected nondisplaced distal radius metaphyseal fracture. 2. Mild underlying osteoarthrosis. Soft tissue swelling about the right wrist. Right hand: 1. Suspected nondisplaced distal radius metaphyseal fracture. 2. Mild underlying osteoarthrosis. 3. Right wrist soft tissue swelling. Left forearm: 1. Acute comminuted impacted distal radius fracture. 2. Soft tissue swelling about the left wrist. Left wrist: 1. Acute comminuted intra-articular distal radius fracture with impaction. 2. Moderate 1st CMC joint osteoarthrosis. 3. Mild soft tissue swelling about the left wrist. Left hand: 1. Acute comminuted intra-articular distal radius fracture. 2. Moderate 1st CMC joint osteoarthrosis. CPPD.  3. Soft tissue swelling about the left wrist.     XR RADIUS ULNA RIGHT (2 VIEWS)    Result Date: 12/23/2022  EXAMINATION: TWO XRAY VIEWS OF THE LEFT FOREARM; TWO XRAY VIEWS OF THE RIGHT FOREARM; 4 XRAY VIEWS OF THE RIGHT WRIST; THREE XRAY VIEWS OF THE RIGHT HAND; 4 XRAY VIEWS OF THE LEFT WRIST; THREE XRAY VIEWS OF THE LEFT HAND 12/23/2022 12:26 pm COMPARISON: None. HISTORY: ORDERING SYSTEM PROVIDED HISTORY: fall, pain TECHNOLOGIST PROVIDED HISTORY: fall, pain Reason for Exam: fall, ankle pain 60-year-old female with fall and bilateral arm and wrist pain FINDINGS: Right forearm: Acute impacted distal radius metaphyseal fracture. Mild degenerative changes of the triscaphe and 1st CMC joints. Ulna appears intact. Mild soft tissue swelling about the right wrist. Right wrist: Mild degenerative changes of the triscaphe and 1st CMC joints. Suspected nondisplaced distal radius metaphyseal fracture. Scaphoid appears intact mild soft tissue swelling about the right wrist. Right hand: Mild degenerative changes of the distal interphalangeal joints. Mild degenerative changes of the triscaphe and 1st CMC joints. Suspected nondisplaced distal radius metaphyseal fracture. Scaphoid appears intact. Left forearm: Acute comminuted, impacted intra-articular distal radius fracture. Mild soft tissue swelling about the left wrist. Left wrist: Acute comminuted intra-articular distal radius fracture with impaction. Moderate degenerative changes of the 1st CMC and triscaphe joints. Mild soft tissue swelling about the left wrist. Left hand: Acute comminuted impacted intra-articular distal radius fracture. Moderate degenerative changes of the triscaphe and 1st CMC joints. Mild degenerative changes of the interphalangeal joints. Mild soft tissue swelling about the left wrist.  Chondrocalcinosis of the TFCC. Right forearm: 1. Acute impacted metaphyseal distal radius fracture. 2. Mild underlying osteoarthrosis. 3. Soft tissue swelling about the right wrist. Right wrist: 1. Suspected nondisplaced distal radius metaphyseal fracture. 2. Mild underlying osteoarthrosis.   Soft tissue swelling about the right wrist. Right hand: 1. Suspected nondisplaced distal radius metaphyseal fracture. 2. Mild underlying osteoarthrosis. 3. Right wrist soft tissue swelling. Left forearm: 1. Acute comminuted impacted distal radius fracture. 2. Soft tissue swelling about the left wrist. Left wrist: 1. Acute comminuted intra-articular distal radius fracture with impaction. 2. Moderate 1st CMC joint osteoarthrosis. 3. Mild soft tissue swelling about the left wrist. Left hand: 1. Acute comminuted intra-articular distal radius fracture. 2. Moderate 1st CMC joint osteoarthrosis. CPPD. 3. Soft tissue swelling about the left wrist.     XR WRIST LEFT (2 VIEWS)    Result Date: 12/23/2022  EXAMINATION: XRAY VIEWS OF THE LEFT WRIST 12/23/2022 2:40 pm COMPARISON: None. HISTORY: ORDERING SYSTEM PROVIDED HISTORY: Post-Reduction TECHNOLOGIST PROVIDED HISTORY: Post-Reduction FINDINGS: Left wrist: 2 post reduction radiographs are provided. One post reduction radiograph is obtained after placing splint. There has been successful reduction of comminuted intra-articular mildly displaced fracture of distal radial epiphysis. There is persistent mild dorsal impaction. Severe degenerative changes of 1st carpometacarpal joint. Successful reduction of comminuted intra-articular mildly displaced fracture of distal radial epiphysis. There is persistent mild dorsal impaction. RECOMMENDATION:     XR WRIST LEFT (2 VIEWS)    Result Date: 12/23/2022  EXAMINATION: XRAY VIEWS OF THE LEFT WRIST 12/23/2022 2:40 pm COMPARISON: None. HISTORY: ORDERING SYSTEM PROVIDED HISTORY: Post-Reduction TECHNOLOGIST PROVIDED HISTORY: Post-Reduction FINDINGS: Left wrist: 2 post reduction radiographs are provided. One post reduction radiograph is obtained after placing splint. There has been successful reduction of comminuted intra-articular mildly displaced fracture of distal radial epiphysis. There is persistent mild dorsal impaction.   Severe degenerative changes of 1st carpometacarpal joint. Successful reduction of comminuted intra-articular mildly displaced fracture of distal radial epiphysis. There is persistent mild dorsal impaction. RECOMMENDATION:     XR WRIST LEFT (MIN 3 VIEWS)    Result Date: 12/23/2022  EXAMINATION: TWO XRAY VIEWS OF THE LEFT FOREARM; TWO XRAY VIEWS OF THE RIGHT FOREARM; 4 XRAY VIEWS OF THE RIGHT WRIST; THREE XRAY VIEWS OF THE RIGHT HAND; 4 XRAY VIEWS OF THE LEFT WRIST; THREE XRAY VIEWS OF THE LEFT HAND 12/23/2022 12:26 pm COMPARISON: None. HISTORY: ORDERING SYSTEM PROVIDED HISTORY: fall, pain TECHNOLOGIST PROVIDED HISTORY: fall, pain Reason for Exam: fall, ankle pain 69-year-old female with fall and bilateral arm and wrist pain FINDINGS: Right forearm: Acute impacted distal radius metaphyseal fracture. Mild degenerative changes of the triscaphe and 1st CMC joints. Ulna appears intact. Mild soft tissue swelling about the right wrist. Right wrist: Mild degenerative changes of the triscaphe and 1st CMC joints. Suspected nondisplaced distal radius metaphyseal fracture. Scaphoid appears intact mild soft tissue swelling about the right wrist. Right hand: Mild degenerative changes of the distal interphalangeal joints. Mild degenerative changes of the triscaphe and 1st CMC joints. Suspected nondisplaced distal radius metaphyseal fracture. Scaphoid appears intact. Left forearm: Acute comminuted, impacted intra-articular distal radius fracture. Mild soft tissue swelling about the left wrist. Left wrist: Acute comminuted intra-articular distal radius fracture with impaction. Moderate degenerative changes of the 1st CMC and triscaphe joints. Mild soft tissue swelling about the left wrist. Left hand: Acute comminuted impacted intra-articular distal radius fracture. Moderate degenerative changes of the triscaphe and 1st CMC joints. Mild degenerative changes of the interphalangeal joints.   Mild soft tissue swelling about the left wrist.  Chondrocalcinosis of the TFCC. Right forearm: 1. Acute impacted metaphyseal distal radius fracture. 2. Mild underlying osteoarthrosis. 3. Soft tissue swelling about the right wrist. Right wrist: 1. Suspected nondisplaced distal radius metaphyseal fracture. 2. Mild underlying osteoarthrosis. Soft tissue swelling about the right wrist. Right hand: 1. Suspected nondisplaced distal radius metaphyseal fracture. 2. Mild underlying osteoarthrosis. 3. Right wrist soft tissue swelling. Left forearm: 1. Acute comminuted impacted distal radius fracture. 2. Soft tissue swelling about the left wrist. Left wrist: 1. Acute comminuted intra-articular distal radius fracture with impaction. 2. Moderate 1st CMC joint osteoarthrosis. 3. Mild soft tissue swelling about the left wrist. Left hand: 1. Acute comminuted intra-articular distal radius fracture. 2. Moderate 1st CMC joint osteoarthrosis. CPPD. 3. Soft tissue swelling about the left wrist.     XR WRIST RIGHT (MIN 3 VIEWS)    Result Date: 12/23/2022  EXAMINATION: TWO XRAY VIEWS OF THE LEFT FOREARM; TWO XRAY VIEWS OF THE RIGHT FOREARM; 4 XRAY VIEWS OF THE RIGHT WRIST; THREE XRAY VIEWS OF THE RIGHT HAND; 4 XRAY VIEWS OF THE LEFT WRIST; THREE XRAY VIEWS OF THE LEFT HAND 12/23/2022 12:26 pm COMPARISON: None. HISTORY: ORDERING SYSTEM PROVIDED HISTORY: fall, pain TECHNOLOGIST PROVIDED HISTORY: fall, pain Reason for Exam: fall, ankle pain 57-year-old female with fall and bilateral arm and wrist pain FINDINGS: Right forearm: Acute impacted distal radius metaphyseal fracture. Mild degenerative changes of the triscaphe and 1st CMC joints. Ulna appears intact. Mild soft tissue swelling about the right wrist. Right wrist: Mild degenerative changes of the triscaphe and 1st CMC joints. Suspected nondisplaced distal radius metaphyseal fracture. Scaphoid appears intact mild soft tissue swelling about the right wrist. Right hand: Mild degenerative changes of the distal interphalangeal joints.  Mild degenerative changes of the triscaphe and 1st CMC joints. Suspected nondisplaced distal radius metaphyseal fracture. Scaphoid appears intact. Left forearm: Acute comminuted, impacted intra-articular distal radius fracture. Mild soft tissue swelling about the left wrist. Left wrist: Acute comminuted intra-articular distal radius fracture with impaction. Moderate degenerative changes of the 1st CMC and triscaphe joints. Mild soft tissue swelling about the left wrist. Left hand: Acute comminuted impacted intra-articular distal radius fracture. Moderate degenerative changes of the triscaphe and 1st CMC joints. Mild degenerative changes of the interphalangeal joints. Mild soft tissue swelling about the left wrist.  Chondrocalcinosis of the TFCC. Right forearm: 1. Acute impacted metaphyseal distal radius fracture. 2. Mild underlying osteoarthrosis. 3. Soft tissue swelling about the right wrist. Right wrist: 1. Suspected nondisplaced distal radius metaphyseal fracture. 2. Mild underlying osteoarthrosis. Soft tissue swelling about the right wrist. Right hand: 1. Suspected nondisplaced distal radius metaphyseal fracture. 2. Mild underlying osteoarthrosis. 3. Right wrist soft tissue swelling. Left forearm: 1. Acute comminuted impacted distal radius fracture. 2. Soft tissue swelling about the left wrist. Left wrist: 1. Acute comminuted intra-articular distal radius fracture with impaction. 2. Moderate 1st CMC joint osteoarthrosis. 3. Mild soft tissue swelling about the left wrist. Left hand: 1. Acute comminuted intra-articular distal radius fracture. 2. Moderate 1st CMC joint osteoarthrosis. CPPD.  3. Soft tissue swelling about the left wrist.     XR HAND LEFT (MIN 3 VIEWS)    Result Date: 12/23/2022  EXAMINATION: TWO XRAY VIEWS OF THE LEFT FOREARM; TWO XRAY VIEWS OF THE RIGHT FOREARM; 4 XRAY VIEWS OF THE RIGHT WRIST; THREE XRAY VIEWS OF THE RIGHT HAND; 4 XRAY VIEWS OF THE LEFT WRIST; THREE XRAY VIEWS OF THE LEFT HAND 12/23/2022 12:26 pm COMPARISON: None. HISTORY: ORDERING SYSTEM PROVIDED HISTORY: fall, pain TECHNOLOGIST PROVIDED HISTORY: fall, pain Reason for Exam: fall, ankle pain 27-year-old female with fall and bilateral arm and wrist pain FINDINGS: Right forearm: Acute impacted distal radius metaphyseal fracture. Mild degenerative changes of the triscaphe and 1st CMC joints. Ulna appears intact. Mild soft tissue swelling about the right wrist. Right wrist: Mild degenerative changes of the triscaphe and 1st CMC joints. Suspected nondisplaced distal radius metaphyseal fracture. Scaphoid appears intact mild soft tissue swelling about the right wrist. Right hand: Mild degenerative changes of the distal interphalangeal joints. Mild degenerative changes of the triscaphe and 1st CMC joints. Suspected nondisplaced distal radius metaphyseal fracture. Scaphoid appears intact. Left forearm: Acute comminuted, impacted intra-articular distal radius fracture. Mild soft tissue swelling about the left wrist. Left wrist: Acute comminuted intra-articular distal radius fracture with impaction. Moderate degenerative changes of the 1st CMC and triscaphe joints. Mild soft tissue swelling about the left wrist. Left hand: Acute comminuted impacted intra-articular distal radius fracture. Moderate degenerative changes of the triscaphe and 1st CMC joints. Mild degenerative changes of the interphalangeal joints. Mild soft tissue swelling about the left wrist.  Chondrocalcinosis of the TFCC. Right forearm: 1. Acute impacted metaphyseal distal radius fracture. 2. Mild underlying osteoarthrosis. 3. Soft tissue swelling about the right wrist. Right wrist: 1. Suspected nondisplaced distal radius metaphyseal fracture. 2. Mild underlying osteoarthrosis. Soft tissue swelling about the right wrist. Right hand: 1. Suspected nondisplaced distal radius metaphyseal fracture. 2. Mild underlying osteoarthrosis.  3. Right wrist soft tissue swelling. Left forearm: 1. Acute comminuted impacted distal radius fracture. 2. Soft tissue swelling about the left wrist. Left wrist: 1. Acute comminuted intra-articular distal radius fracture with impaction. 2. Moderate 1st CMC joint osteoarthrosis. 3. Mild soft tissue swelling about the left wrist. Left hand: 1. Acute comminuted intra-articular distal radius fracture. 2. Moderate 1st CMC joint osteoarthrosis. CPPD. 3. Soft tissue swelling about the left wrist.     XR HAND RIGHT (MIN 3 VIEWS)    Result Date: 12/23/2022  EXAMINATION: TWO XRAY VIEWS OF THE LEFT FOREARM; TWO XRAY VIEWS OF THE RIGHT FOREARM; 4 XRAY VIEWS OF THE RIGHT WRIST; THREE XRAY VIEWS OF THE RIGHT HAND; 4 XRAY VIEWS OF THE LEFT WRIST; THREE XRAY VIEWS OF THE LEFT HAND 12/23/2022 12:26 pm COMPARISON: None. HISTORY: ORDERING SYSTEM PROVIDED HISTORY: fall, pain TECHNOLOGIST PROVIDED HISTORY: fall, pain Reason for Exam: fall, ankle pain 39-year-old female with fall and bilateral arm and wrist pain FINDINGS: Right forearm: Acute impacted distal radius metaphyseal fracture. Mild degenerative changes of the triscaphe and 1st CMC joints. Ulna appears intact. Mild soft tissue swelling about the right wrist. Right wrist: Mild degenerative changes of the triscaphe and 1st CMC joints. Suspected nondisplaced distal radius metaphyseal fracture. Scaphoid appears intact mild soft tissue swelling about the right wrist. Right hand: Mild degenerative changes of the distal interphalangeal joints. Mild degenerative changes of the triscaphe and 1st CMC joints. Suspected nondisplaced distal radius metaphyseal fracture. Scaphoid appears intact. Left forearm: Acute comminuted, impacted intra-articular distal radius fracture. Mild soft tissue swelling about the left wrist. Left wrist: Acute comminuted intra-articular distal radius fracture with impaction. Moderate degenerative changes of the 1st CMC and triscaphe joints.   Mild soft tissue swelling about the left wrist. Left hand: Acute comminuted impacted intra-articular distal radius fracture. Moderate degenerative changes of the triscaphe and 1st CMC joints. Mild degenerative changes of the interphalangeal joints. Mild soft tissue swelling about the left wrist.  Chondrocalcinosis of the TFCC. Right forearm: 1. Acute impacted metaphyseal distal radius fracture. 2. Mild underlying osteoarthrosis. 3. Soft tissue swelling about the right wrist. Right wrist: 1. Suspected nondisplaced distal radius metaphyseal fracture. 2. Mild underlying osteoarthrosis. Soft tissue swelling about the right wrist. Right hand: 1. Suspected nondisplaced distal radius metaphyseal fracture. 2. Mild underlying osteoarthrosis. 3. Right wrist soft tissue swelling. Left forearm: 1. Acute comminuted impacted distal radius fracture. 2. Soft tissue swelling about the left wrist. Left wrist: 1. Acute comminuted intra-articular distal radius fracture with impaction. 2. Moderate 1st CMC joint osteoarthrosis. 3. Mild soft tissue swelling about the left wrist. Left hand: 1. Acute comminuted intra-articular distal radius fracture. 2. Moderate 1st CMC joint osteoarthrosis. CPPD. 3. Soft tissue swelling about the left wrist.          EMERGENCY DEPARTMENT COURSE:     X-ray demonstrates bilateral radial fractures. Nondisplaced right radial fracture, patient does have left impacted intra-articular radial fracture. Orthopedics consulted. Signed out to oncoming resident awaiting orthopedic recommendations    PROCEDURES:  None    CONSULTS:  IP CONSULT TO ORTHOPEDIC SURGERY    CRITICAL CARE:  Please see attending note    FINAL IMPRESSION      1. Closed fracture of distal end of right radius, unspecified fracture morphology, initial encounter    2.  Closed fracture of distal end of left radius, unspecified fracture morphology, initial encounter         DISPOSITION / PLAN     DISPOSITION    Signed out to oncoming resident awaiting orthopedic recommendations    PATIENTREFERRED TO:  No follow-up provider specified. DISCHARGE MEDICATIONS:  New Prescriptions    OXYCODONE (ROXICODONE) 5 MG IMMEDIATE RELEASE TABLET    Take 1 tablet by mouth every 6 hours as needed for Pain for up to 3 days.  Max Daily Amount: 20 mg       Maci Mcgowan DO  EmergencyMedicine Resident    (Please note that portions of this note were completed with a voice recognition program.  Efforts were made to edit the dictations but occasionally words are mis-transcribed.)        Maci Mcgowan DO  Resident  12/23/22 [id number]

## 2022-12-23 NOTE — ED NOTES
Pt resting on stretcher, RR even and non labored, NAD at this moment, call light within reach.       Mark Johnson RN  12/23/22 [id number]

## 2022-12-23 NOTE — Clinical Note
Ivy Chery was seen and treated in our emergency department on 12/23/2022.     She should remain out of work until she is able to follow-up with her orthopedic surgeon    Mauricio Alexander DO

## 2022-12-23 NOTE — ED TRIAGE NOTES
Pt comes to ED from work with c/o bilateral wrist pain and coccyx pain after fall. Pt states opening a door an hour ago, handle breaking, falling backwards, no head injury, no LOC, no blood thinners, has coccyx and bilateral wrist pain, has not taken any medications for pain. Pt has left wrist deformity, limited finger and wrist movement; pulses present +2. Pt denies chest pain, SOB, dizziness, fatigue, headache, MI hx, lung hx, stroke hx, and clot hx. Pt a/o x4, RR even and non labored, NAD at this moment, call light within reach.

## 2022-12-25 ENCOUNTER — ANESTHESIA EVENT (OUTPATIENT)
Dept: OPERATING ROOM | Age: 62
End: 2022-12-25
Payer: COMMERCIAL

## 2022-12-26 ENCOUNTER — APPOINTMENT (OUTPATIENT)
Dept: GENERAL RADIOLOGY | Age: 62
End: 2022-12-26
Attending: STUDENT IN AN ORGANIZED HEALTH CARE EDUCATION/TRAINING PROGRAM
Payer: COMMERCIAL

## 2022-12-26 ENCOUNTER — ANESTHESIA (OUTPATIENT)
Dept: OPERATING ROOM | Age: 62
End: 2022-12-26
Payer: COMMERCIAL

## 2022-12-26 ENCOUNTER — HOSPITAL ENCOUNTER (OUTPATIENT)
Age: 62
Setting detail: OUTPATIENT SURGERY
Discharge: HOME | End: 2022-12-26
Attending: STUDENT IN AN ORGANIZED HEALTH CARE EDUCATION/TRAINING PROGRAM | Admitting: STUDENT IN AN ORGANIZED HEALTH CARE EDUCATION/TRAINING PROGRAM
Payer: COMMERCIAL

## 2022-12-26 VITALS
WEIGHT: 160 LBS | HEART RATE: 68 BPM | OXYGEN SATURATION: 91 % | DIASTOLIC BLOOD PRESSURE: 64 MMHG | RESPIRATION RATE: 15 BRPM | TEMPERATURE: 96.3 F | HEIGHT: 72 IN | SYSTOLIC BLOOD PRESSURE: 154 MMHG | BODY MASS INDEX: 21.67 KG/M2

## 2022-12-26 DIAGNOSIS — G89.18 POST-OP PAIN: Primary | ICD-10-CM

## 2022-12-26 PROBLEM — S52.502A CLOSED FRACTURE OF LEFT DISTAL RADIUS: Status: ACTIVE | Noted: 2022-12-26

## 2022-12-26 PROBLEM — S52.501A CLOSED FRACTURE OF RIGHT DISTAL RADIUS: Status: ACTIVE | Noted: 2022-12-26

## 2022-12-26 PROCEDURE — 2500000003 HC RX 250 WO HCPCS: Performed by: ANESTHESIOLOGY

## 2022-12-26 PROCEDURE — 6360000002 HC RX W HCPCS: Performed by: STUDENT IN AN ORGANIZED HEALTH CARE EDUCATION/TRAINING PROGRAM

## 2022-12-26 PROCEDURE — C1713 ANCHOR/SCREW BN/BN,TIS/BN: HCPCS | Performed by: STUDENT IN AN ORGANIZED HEALTH CARE EDUCATION/TRAINING PROGRAM

## 2022-12-26 PROCEDURE — 7100000001 HC PACU RECOVERY - ADDTL 15 MIN: Performed by: STUDENT IN AN ORGANIZED HEALTH CARE EDUCATION/TRAINING PROGRAM

## 2022-12-26 PROCEDURE — 7100000010 HC PHASE II RECOVERY - FIRST 15 MIN: Performed by: STUDENT IN AN ORGANIZED HEALTH CARE EDUCATION/TRAINING PROGRAM

## 2022-12-26 PROCEDURE — 6360000002 HC RX W HCPCS

## 2022-12-26 PROCEDURE — 3700000001 HC ADD 15 MINUTES (ANESTHESIA): Performed by: STUDENT IN AN ORGANIZED HEALTH CARE EDUCATION/TRAINING PROGRAM

## 2022-12-26 PROCEDURE — 2709999900 HC NON-CHARGEABLE SUPPLY: Performed by: STUDENT IN AN ORGANIZED HEALTH CARE EDUCATION/TRAINING PROGRAM

## 2022-12-26 PROCEDURE — 7100000000 HC PACU RECOVERY - FIRST 15 MIN: Performed by: STUDENT IN AN ORGANIZED HEALTH CARE EDUCATION/TRAINING PROGRAM

## 2022-12-26 PROCEDURE — 2580000003 HC RX 258: Performed by: ANESTHESIOLOGY

## 2022-12-26 PROCEDURE — 76942 ECHO GUIDE FOR BIOPSY: CPT | Performed by: ANESTHESIOLOGY

## 2022-12-26 PROCEDURE — 3600000004 HC SURGERY LEVEL 4 BASE: Performed by: STUDENT IN AN ORGANIZED HEALTH CARE EDUCATION/TRAINING PROGRAM

## 2022-12-26 PROCEDURE — 6360000002 HC RX W HCPCS: Performed by: ANESTHESIOLOGY

## 2022-12-26 PROCEDURE — 2720000010 HC SURG SUPPLY STERILE: Performed by: STUDENT IN AN ORGANIZED HEALTH CARE EDUCATION/TRAINING PROGRAM

## 2022-12-26 PROCEDURE — 73110 X-RAY EXAM OF WRIST: CPT

## 2022-12-26 PROCEDURE — 3600000014 HC SURGERY LEVEL 4 ADDTL 15MIN: Performed by: STUDENT IN AN ORGANIZED HEALTH CARE EDUCATION/TRAINING PROGRAM

## 2022-12-26 PROCEDURE — 3209999900 FLUORO FOR SURGICAL PROCEDURES

## 2022-12-26 PROCEDURE — 2580000003 HC RX 258: Performed by: STUDENT IN AN ORGANIZED HEALTH CARE EDUCATION/TRAINING PROGRAM

## 2022-12-26 PROCEDURE — 3700000000 HC ANESTHESIA ATTENDED CARE: Performed by: STUDENT IN AN ORGANIZED HEALTH CARE EDUCATION/TRAINING PROGRAM

## 2022-12-26 DEVICE — IMPLANTABLE DEVICE: Type: IMPLANTABLE DEVICE | Site: ARM | Status: FUNCTIONAL

## 2022-12-26 DEVICE — SCREW BNE L18MM DIA2.4MM S STL ST LOK FULL THRD T8 STARDRV: Type: IMPLANTABLE DEVICE | Site: ARM | Status: FUNCTIONAL

## 2022-12-26 DEVICE — PLATE BNE STR 2.4X170 MM WRST SS STRL LCP: Type: IMPLANTABLE DEVICE | Site: WRIST | Status: FUNCTIONAL

## 2022-12-26 DEVICE — SCREW BNE L14MM DIA2.4MM S STL ST LOK FULL THRD T8 STARDRV: Type: IMPLANTABLE DEVICE | Site: ARM | Status: FUNCTIONAL

## 2022-12-26 DEVICE — SCREW BNE L16MM DIA2.7MM CORT S STL ST T8 STARDRV RECESS: Type: IMPLANTABLE DEVICE | Site: ARM | Status: FUNCTIONAL

## 2022-12-26 RX ORDER — LIDOCAINE HYDROCHLORIDE 10 MG/ML
INJECTION, SOLUTION EPIDURAL; INFILTRATION; INTRACAUDAL; PERINEURAL PRN
Status: DISCONTINUED | OUTPATIENT
Start: 2022-12-26 | End: 2022-12-26 | Stop reason: SDUPTHER

## 2022-12-26 RX ORDER — MEPERIDINE HYDROCHLORIDE 50 MG/ML
12.5 INJECTION INTRAMUSCULAR; INTRAVENOUS; SUBCUTANEOUS EVERY 5 MIN PRN
Status: DISCONTINUED | OUTPATIENT
Start: 2022-12-26 | End: 2022-12-26 | Stop reason: HOSPADM

## 2022-12-26 RX ORDER — TOBRAMYCIN 1.2 G/30ML
INJECTION, POWDER, LYOPHILIZED, FOR SOLUTION INTRAVENOUS PRN
Status: DISCONTINUED | OUTPATIENT
Start: 2022-12-26 | End: 2022-12-26 | Stop reason: HOSPADM

## 2022-12-26 RX ORDER — VANCOMYCIN HYDROCHLORIDE 1 G/20ML
INJECTION, POWDER, LYOPHILIZED, FOR SOLUTION INTRAVENOUS PRN
Status: DISCONTINUED | OUTPATIENT
Start: 2022-12-26 | End: 2022-12-26 | Stop reason: HOSPADM

## 2022-12-26 RX ORDER — DROPERIDOL 2.5 MG/ML
0.62 INJECTION, SOLUTION INTRAMUSCULAR; INTRAVENOUS
Status: DISCONTINUED | OUTPATIENT
Start: 2022-12-26 | End: 2022-12-26 | Stop reason: HOSPADM

## 2022-12-26 RX ORDER — CYCLOBENZAPRINE HCL 10 MG
10 TABLET ORAL 3 TIMES DAILY PRN
Qty: 21 TABLET | Refills: 0 | Status: SHIPPED | OUTPATIENT
Start: 2022-12-26 | End: 2023-01-05

## 2022-12-26 RX ORDER — BUPIVACAINE HYDROCHLORIDE 5 MG/ML
INJECTION, SOLUTION EPIDURAL; INTRACAUDAL
Status: COMPLETED | OUTPATIENT
Start: 2022-12-26 | End: 2022-12-26

## 2022-12-26 RX ORDER — HYDRALAZINE HYDROCHLORIDE 20 MG/ML
10 INJECTION INTRAMUSCULAR; INTRAVENOUS
Status: DISCONTINUED | OUTPATIENT
Start: 2022-12-26 | End: 2022-12-26 | Stop reason: HOSPADM

## 2022-12-26 RX ORDER — ROCURONIUM BROMIDE 10 MG/ML
INJECTION, SOLUTION INTRAVENOUS PRN
Status: DISCONTINUED | OUTPATIENT
Start: 2022-12-26 | End: 2022-12-26 | Stop reason: SDUPTHER

## 2022-12-26 RX ORDER — ASCORBIC ACID 500 MG
500 TABLET ORAL DAILY
Qty: 50 TABLET | Refills: 0 | Status: SHIPPED | OUTPATIENT
Start: 2022-12-26 | End: 2023-02-14

## 2022-12-26 RX ORDER — ONDANSETRON 2 MG/ML
INJECTION INTRAMUSCULAR; INTRAVENOUS PRN
Status: DISCONTINUED | OUTPATIENT
Start: 2022-12-26 | End: 2022-12-26 | Stop reason: SDUPTHER

## 2022-12-26 RX ORDER — OXYCODONE HYDROCHLORIDE AND ACETAMINOPHEN 5; 325 MG/1; MG/1
1 TABLET ORAL EVERY 6 HOURS PRN
Qty: 28 TABLET | Refills: 0 | Status: SHIPPED | OUTPATIENT
Start: 2022-12-26 | End: 2023-01-02

## 2022-12-26 RX ORDER — FENTANYL CITRATE 50 UG/ML
INJECTION, SOLUTION INTRAMUSCULAR; INTRAVENOUS PRN
Status: DISCONTINUED | OUTPATIENT
Start: 2022-12-26 | End: 2022-12-26 | Stop reason: SDUPTHER

## 2022-12-26 RX ORDER — SODIUM CHLORIDE 0.9 % (FLUSH) 0.9 %
5-40 SYRINGE (ML) INJECTION EVERY 12 HOURS SCHEDULED
Status: DISCONTINUED | OUTPATIENT
Start: 2022-12-26 | End: 2022-12-26 | Stop reason: HOSPADM

## 2022-12-26 RX ORDER — PROPOFOL 10 MG/ML
INJECTION, EMULSION INTRAVENOUS PRN
Status: DISCONTINUED | OUTPATIENT
Start: 2022-12-26 | End: 2022-12-26 | Stop reason: SDUPTHER

## 2022-12-26 RX ORDER — MAGNESIUM HYDROXIDE 1200 MG/15ML
LIQUID ORAL CONTINUOUS PRN
Status: DISCONTINUED | OUTPATIENT
Start: 2022-12-26 | End: 2022-12-26 | Stop reason: HOSPADM

## 2022-12-26 RX ORDER — DIPHENHYDRAMINE HYDROCHLORIDE 50 MG/ML
12.5 INJECTION INTRAMUSCULAR; INTRAVENOUS
Status: DISCONTINUED | OUTPATIENT
Start: 2022-12-26 | End: 2022-12-26 | Stop reason: HOSPADM

## 2022-12-26 RX ORDER — SODIUM CHLORIDE 9 MG/ML
25 INJECTION, SOLUTION INTRAVENOUS PRN
Status: DISCONTINUED | OUTPATIENT
Start: 2022-12-26 | End: 2022-12-26 | Stop reason: HOSPADM

## 2022-12-26 RX ORDER — METOCLOPRAMIDE HYDROCHLORIDE 5 MG/ML
10 INJECTION INTRAMUSCULAR; INTRAVENOUS
Status: COMPLETED | OUTPATIENT
Start: 2022-12-26 | End: 2022-12-26

## 2022-12-26 RX ORDER — OXYCODONE HYDROCHLORIDE AND ACETAMINOPHEN 5; 325 MG/1; MG/1
1 TABLET ORAL EVERY 6 HOURS PRN
Qty: 28 TABLET | Refills: 0 | Status: SHIPPED | OUTPATIENT
Start: 2022-12-26 | End: 2022-12-26 | Stop reason: SDUPTHER

## 2022-12-26 RX ORDER — LABETALOL HYDROCHLORIDE 5 MG/ML
INJECTION, SOLUTION INTRAVENOUS PRN
Status: DISCONTINUED | OUTPATIENT
Start: 2022-12-26 | End: 2022-12-26 | Stop reason: SDUPTHER

## 2022-12-26 RX ORDER — SODIUM CHLORIDE 0.9 % (FLUSH) 0.9 %
5-40 SYRINGE (ML) INJECTION PRN
Status: DISCONTINUED | OUTPATIENT
Start: 2022-12-26 | End: 2022-12-26 | Stop reason: HOSPADM

## 2022-12-26 RX ORDER — DEXAMETHASONE SODIUM PHOSPHATE 10 MG/ML
INJECTION INTRAMUSCULAR; INTRAVENOUS PRN
Status: DISCONTINUED | OUTPATIENT
Start: 2022-12-26 | End: 2022-12-26 | Stop reason: SDUPTHER

## 2022-12-26 RX ORDER — SODIUM CHLORIDE, SODIUM LACTATE, POTASSIUM CHLORIDE, CALCIUM CHLORIDE 600; 310; 30; 20 MG/100ML; MG/100ML; MG/100ML; MG/100ML
INJECTION, SOLUTION INTRAVENOUS CONTINUOUS PRN
Status: DISCONTINUED | OUTPATIENT
Start: 2022-12-26 | End: 2022-12-26 | Stop reason: SDUPTHER

## 2022-12-26 RX ADMIN — FENTANYL CITRATE 50 MCG: 50 INJECTION, SOLUTION INTRAMUSCULAR; INTRAVENOUS at 08:18

## 2022-12-26 RX ADMIN — Medication 5 MG: at 09:32

## 2022-12-26 RX ADMIN — LIDOCAINE HYDROCHLORIDE 50 MG: 10 INJECTION, SOLUTION EPIDURAL; INFILTRATION; INTRACAUDAL; PERINEURAL at 07:46

## 2022-12-26 RX ADMIN — ROCURONIUM BROMIDE 50 MG: 10 INJECTION, SOLUTION INTRAVENOUS at 07:46

## 2022-12-26 RX ADMIN — SUGAMMADEX 150 MG: 100 INJECTION, SOLUTION INTRAVENOUS at 09:24

## 2022-12-26 RX ADMIN — DEXAMETHASONE SODIUM PHOSPHATE 10 MG: 10 INJECTION INTRAMUSCULAR; INTRAVENOUS at 07:57

## 2022-12-26 RX ADMIN — BUPIVACAINE HYDROCHLORIDE 20 ML: 5 INJECTION, SOLUTION EPIDURAL; INTRACAUDAL; PERINEURAL at 09:25

## 2022-12-26 RX ADMIN — Medication 5 MG: at 08:24

## 2022-12-26 RX ADMIN — PROPOFOL 200 MG: 10 INJECTION, EMULSION INTRAVENOUS at 07:46

## 2022-12-26 RX ADMIN — FENTANYL CITRATE 100 MCG: 50 INJECTION, SOLUTION INTRAMUSCULAR; INTRAVENOUS at 07:46

## 2022-12-26 RX ADMIN — ONDANSETRON 4 MG: 2 INJECTION INTRAMUSCULAR; INTRAVENOUS at 09:01

## 2022-12-26 RX ADMIN — Medication 2000 MG: at 07:57

## 2022-12-26 RX ADMIN — SODIUM CHLORIDE, POTASSIUM CHLORIDE, SODIUM LACTATE AND CALCIUM CHLORIDE: 600; 310; 30; 20 INJECTION, SOLUTION INTRAVENOUS at 07:39

## 2022-12-26 RX ADMIN — FENTANYL CITRATE 50 MCG: 50 INJECTION, SOLUTION INTRAMUSCULAR; INTRAVENOUS at 08:14

## 2022-12-26 RX ADMIN — METOCLOPRAMIDE 10 MG: 5 INJECTION, SOLUTION INTRAMUSCULAR; INTRAVENOUS at 09:53

## 2022-12-26 ASSESSMENT — PAIN DESCRIPTION - PAIN TYPE: TYPE: ACUTE PAIN

## 2022-12-26 ASSESSMENT — PAIN SCALES - GENERAL: PAINLEVEL_OUTOF10: 7

## 2022-12-26 ASSESSMENT — PAIN DESCRIPTION - ONSET: ONSET: ON-GOING

## 2022-12-26 ASSESSMENT — PAIN DESCRIPTION - LOCATION: LOCATION: ARM;WRIST

## 2022-12-26 ASSESSMENT — PAIN DESCRIPTION - DESCRIPTORS: DESCRIPTORS: ACHING;DISCOMFORT

## 2022-12-26 ASSESSMENT — PAIN DESCRIPTION - FREQUENCY: FREQUENCY: CONTINUOUS

## 2022-12-26 ASSESSMENT — PAIN DESCRIPTION - ORIENTATION: ORIENTATION: RIGHT;LEFT

## 2022-12-26 NOTE — ANESTHESIA PRE PROCEDURE
Department of Anesthesiology  Preprocedure Note       Name:  Tim Every   Age:  60 y.o.  :  1960                                          MRN:  [id number]         Date:  2022      Surgeon: Suzanne Castro):  Zehra Tang DO    Procedure: Procedure(s):  **ADD ON APPROVED PER ANESTHESIA AND K.H.**RADIUS OPEN REDUCTION INTERNAL FIXATION*SYNTHES DORSAL SPANNING PLATE-NOTIFIED, 4018 WITH HAND TABLE, C-ARM, SUPINE*    Medications prior to admission:   Prior to Admission medications    Medication Sig Start Date End Date Taking? Authorizing Provider   oxyCODONE (ROXICODONE) 5 MG immediate release tablet Take 1 tablet by mouth every 6 hours as needed for Pain for up to 3 days. Max Daily Amount: 20 mg 22  Elena Harris DO       Current medications:    No current facility-administered medications for this encounter. Allergies:  No Known Allergies    Problem List:    Patient Active Problem List   Diagnosis Code    Pneumonia J18.9    Chronic obstructive pulmonary disease with acute exacerbation (HCC) J44.1    PVC (premature ventricular contraction) I49.3    Liver enzyme elevation R74.8    Steroid-induced hyperglycemia R73.9, T38.0X5A    Anemia D64.9       Past Medical History:        Diagnosis Date    Cervical dysplasia     Factor 5 Leiden mutation, heterozygous ([facility] [address]     No blood thinners, no past clot history per pt.  Influenza B 2018    Pneumonia 2018       Past Surgical History:        Procedure Laterality Date    CHOLECYSTECTOMY  2018    robotic    KNEE ARTHROSCOPY      x4 on Right, x1 Left Knee    NM LAP,CHOLECYSTECTOMY N/A 2018    CHOLECYSTECTOMY LAPAROSCOPIC ROBOTIC performed by Martha Hdz MD at [id number] [place] [address]         Social History:    Social History     Tobacco Use    Smoking status: Every Day     Packs/day: 1.00     Types: Cigarettes    Smokeless tobacco: Never   Substance Use Topics    Alcohol use:  No Ready to quit: Not Answered  Counseling given: Not Answered      Vital Signs (Current):   Vitals:    12/26/22 0645   BP: (!) 143/64   Pulse: 60   Resp: 20   Temp: 97.9 °F (36.6 °C)   TempSrc: Temporal   SpO2: 96%   Weight: 160 lb (72.6 kg)   Height: 6' 3\" (1.905 m)                                              BP Readings from Last 3 Encounters:   12/26/22 (!) 143/64   12/23/22 (!) 107/49   11/08/21 (!) 159/86       NPO Status: Time of last liquid consumption: 2200                        Time of last solid consumption: 2200                        Date of last liquid consumption: 12/25/22                        Date of last solid food consumption: 12/25/22    BMI:   Wt Readings from Last 3 Encounters:   12/26/22 160 lb (72.6 kg)   12/23/22 160 lb (72.6 kg)   11/08/21 160 lb (72.6 kg)     Body mass index is 20 kg/m². CBC:   Lab Results   Component Value Date/Time    WBC 10.6 02/04/2018 05:55 AM    RBC 3.48 02/04/2018 05:55 AM    HGB 11.0 02/04/2018 05:55 AM    HCT 33.0 02/04/2018 05:55 AM    MCV 94.7 02/04/2018 05:55 AM    RDW 13.4 02/04/2018 05:55 AM     02/04/2018 05:55 AM       CMP:   Lab Results   Component Value Date/Time     02/04/2018 05:55 AM    K 4.6 02/04/2018 05:55 AM    CL 99 02/04/2018 05:55 AM    CO2 26 02/04/2018 05:55 AM    BUN 14 02/04/2018 05:55 AM    CREATININE 0.42 02/04/2018 05:55 AM    GFRAA >60 02/04/2018 05:55 AM    LABGLOM >60 02/04/2018 05:55 AM    GLUCOSE 116 02/04/2018 05:55 AM    PROT 5.7 02/04/2018 05:55 AM    CALCIUM 8.7 02/04/2018 05:55 AM    BILITOT 0.34 02/04/2018 05:55 AM    ALKPHOS 61 02/04/2018 05:55 AM    AST 17 02/04/2018 05:55 AM    ALT 28 02/04/2018 05:55 AM       POC Tests: No results for input(s): POCGLU, POCNA, POCK, POCCL, POCBUN, POCHEMO, POCHCT in the last 72 hours.     Coags: No results found for: PROTIME, INR, APTT    HCG (If Applicable): No results found for: PREGTESTUR, PREGSERUM, HCG, HCGQUANT     ABGs: No results found for: PHART, PO2ART, XDF3FFN, XIQ1AOQ, BEART, O6FXBMMZ     Type & Screen (If Applicable):  No results found for: LABABO, LABRH    Drug/Infectious Status (If Applicable):  Lab Results   Component Value Date/Time    HEPCAB NONREACTIVE 02/02/2018 01:51 PM       COVID-19 Screening (If Applicable): No results found for: COVID19        Anesthesia Evaluation  Patient summary reviewed and Nursing notes reviewed no history of anesthetic complications:   Airway: Mallampati: IV  TM distance: >3 FB   Neck ROM: full  Mouth opening: > = 3 FB   Dental:    (+) upper dentures      Pulmonary:normal exam    (+) COPD:                             Cardiovascular:Negative CV ROS                      Neuro/Psych:               GI/Hepatic/Renal: Neg GI/Hepatic/Renal ROS            Endo/Other: Negative Endo/Other ROS                    Abdominal:             Vascular: Other Findings:           Anesthesia Plan      general     ASA 2     (Nerve block)  Induction: intravenous. MIPS: Postoperative opioids intended and Prophylactic antiemetics administered. Anesthetic plan and risks discussed with patient. Plan discussed with CRNA.                     Kike Soto MD   12/26/2022

## 2022-12-26 NOTE — ANESTHESIA POSTPROCEDURE EVALUATION
POST- ANESTHESIA EVALUATION       Pt Name: Bernardo Mcgill  MRN: [id number]  YOB: 1960  Date of evaluation: 12/26/2022  Time:  10:06 AM      BP (!) 150/73   Pulse 73   Temp (!) 96.3 °F (35.7 °C) (Temporal)   Resp 19   Ht 6' 3\" (1.905 m)   Wt 160 lb (72.6 kg)   SpO2 92%   BMI 20.00 kg/m²      Consciousness Level  Awake  Cardiopulmonary Status  Stable  Pain Adequately Treated YES  Nausea / Vomiting  NO  Adequate Hydration  YES  Anesthesia Related Complications NONE      Electronically signed by Michaela Prescott MD on 12/26/2022 at 10:06 AM       Department of Anesthesiology  Postprocedure Note    Patient: Bernardo Mcgill  MRN: [id number]  YOB: 1960  Date of evaluation: 12/26/2022      Procedure Summary     Date: 12/26/22 Room / Location: [address]    Anesthesia Start: 2056 Anesthesia Stop: 8808    Procedure: RADIUS OPEN REDUCTION INTERNAL FIXATION*SYNTHES DORSAL SPANNING PLATE, C-ARM, APPLICATION OF SPLINT (Left) Diagnosis: Closed fracture of distal end of left radius, unspecified fracture morphology, initial encounter    Surgeons: Leo Estrada DO Responsible Provider: Michaela Prescott MD    Anesthesia Type: General ASA Status: 2          Anesthesia Type: General    Killian Phase I: Killian Score: 10    Killian Phase II:        Anesthesia Post Evaluation

## 2022-12-26 NOTE — BRIEF OP NOTE
Brief Postoperative Note      Patient: Elaine Ruvalcaba  YOB: 1960  MRN: [id number]    Date of Procedure: 12/26/2022    Pre-Op Diagnosis: Left comminuted intra-articular distal radius fracture  Right extra-articular distal radius fracture    Post-Op Diagnosis: Left comminuted intra-articular distal radius fracture  Right extra-articular distal radius fracture       Procedure(s):  Open reduction internal fixation left 3+ part intra-articular distal radius fracture  Closed treatment without manipulation right extra-articular distal radius fracture    Surgeon(s):  Andrea Pereira DO    Assistant:  Resident: Maame Simeon DO; Phoenix Carrillo DO    Anesthesia: General    Estimated Blood Loss (mL): 20    Complications: None    Specimens:   * No specimens in log *    Implants:  Implant Name Type Inv. Item Serial No.  Lot No. LRB No. Used Action   PLATE BNE STR 3.4L445 MM WRST SS STRL LCP - [id number]  PLATE BNE STR 4.0X104 MM WRST SS STRL LCP  [facility]  Left 1 Implanted   SCREW CORTEX STAR 2.4 X 9MM - [id number]  SCREW CORTEX STAR 2.4 X 9MM  [employer]  Left 1 Implanted   SCREW LK SLFTP W/ T8 2.4X11MM - [id number]  SCREW LK SLFTP W/ T8 2.4X11MM  [employer]  Left 1 Implanted   SCREW BNE L14MM DIA2. 4MM S STL ST MECHELLE FULL THRD T8 STARDRV - [id number]  SCREW BNE L14MM DIA2. 4MM S STL ST MECHELLE FULL THRD T8 STARDRV  [employer]WD  Left 1 Implanted   SCREW BNE L18MM DIA2. 4MM S STL ST MECHELLE FULL THRD T8 STARDRV - [id number]  SCREW BNE L18MM DIA2. 4MM S STL ST MECHELLE FULL THRD T8 STARDRV  [employer]  Left 1 Implanted   SCREW BNE L16MM DIA2.7MM GALLO S STL ST T8 STARDRV RECESS - [id number]  SCREW BNE L16MM DIA2.7MM GALLO S STL ST T8 STARDRV RECESS  DEPU[employer] [place]  Left 2 Implanted   SCREW BNE L16MM DIA2.7MM GALLO S STL ST MECHELLE FULL THRD T8 - [id number]  SCREW BNE L16MM DIA2.7MM GALLO S STL ST MECHELLE FULL THRD T8  [employer]  Left 1 Implanted   SCREW LK SLFTP W/ T8 2. 7X12MM - [id number]  SCREW LK SLFTP W/ T8 2.7X12MM  [employer]  Left 1 Implanted         Drains: * No LDAs found *    Findings: see op note    Electronically signed by Anya Claros DO on 12/26/2022 at 9:02 AM

## 2022-12-26 NOTE — OP NOTE
[address] [address]                                OPERATIVE REPORT    PATIENT NAME: Awilda Easton                     :        1960  MED REC NO:   [id number]                             ROOM:  ACCOUNT NO:   [de-identified]                           ADMIT DATE: 2022  PROVIDER:     Johanna Perea    DATE OF PROCEDURE:  2022    PREOPERATIVE DIAGNOSES:  1. Left comminuted intra-articular distal radius fracture. 2.  Right extra-articular distal radius fracture. POSTOPERATIVE DIAGNOSES:  1. Left comminuted intra-articular distal radius fracture. 2.  Right extra-articular distal radius fracture. PROCEDURE:  1. Open reduction and internal fixation left 3 plus part  intra-articular distal radius fracture. 2.  Closed treatment without manipulation right extra-articular distal  radius fracture. SURGEON:  Johanna Morton. DO Brit.    ASSISTANTS:  Wendy Cooper DO, PGY-5; Kris Mendez DO, PGY-2. ANESTHESIA:  General.    ESTIMATED BLOOD LOSS:  20 mL. COMPLICATIONS:  None. SPECIMENS:  None. IMPLANT:  Synthes dorsal-spanning plate. FINDINGS:  Displaced and comminuted intra-articular distal radius  fracture on the left and right extra-articular distal radius fracture. INDICATIONS:  This is a 59-year-old female who initially presented to  [place] [address] on 2022 after sustaining a fall  from a standing height onto both arms. She was found to have a  comminuted intra-articular left distal radius fracture as well as a  right extra-articular distal radius fracture. The right distal radius  fracture was placed in a removal splint, but the left one required  closed reduction and application of splint.   Given the amount of  displacement and comminution, recommended operative intervention to  restore anatomic relationships and prevent future wrist deformity and  complications. So I had a discussion with the patient about surgical  and nonsurgical intervention. The patient has elected to undergo open  reduction and internal fixation of left intra-articular distal radius  fracture. Consent was obtained, placed in chart. All questions  answered appropriately. Surgical risks including but limited to  bleeding, blood clots, infection, damage to nearby tissues, vessels and  nerves, would healing complications, failed procedure, stiffness, loss  of motion, hardware failure, hardware irritation, malunion, nonunion,  loss of limb, loss of life were all discussed with the patient. Knowing  these risks, the patient wished to proceed with surgery as indicated. OPERATIVE PROCEDURE:  The patient was taken to the operative suite and  placed under general anesthesia without any complications. 2 gm of  Ancef was given prior to procedure. At this time, all team members  paused to identify proper patient name, indication, site and allergies. All team members were in agreeance. Left upper extremity was then  prepped and draped in normal sterile fashion. Using intraoperative  fluoroscopy, images of the left distal radius were obtained. There was  significant amount of dorsal comminution appreciated with high  instability and crepitus about the distal radius. Skin was intact. There was swelling noted throughout the hand. We lined up our plate  along the skin. We marked where the screw holes would go, and then  using gravity exsanguination, tourniquet was inflated. Skin was  incised, dissection was carried down through the skin and subcutaneous  tissues. Dissection was carried onto the second metacarpal.  We ensured  that the nerve and the extensor tendon were protected and out of the  way. Being satisfied, we turned our attention to the radius. Skin was  incised over the dorsal aspect of the radius.   Dissection was carried  down through the skin, subcutaneous tissues. We exploited the interval  between the outcropping muscles and the extensor tendons of the radius. Dissection  was carried down to the bone. We then took our plate and submuscularly  slid the plate from distal to proximal until it rested adequately on the  bone. We then provisionally held it in place with K-wires after manual  manipulation and satisfactory reduction of her intra-articular distal  radius fracture. Being satisfied with its placement confirmed on both  AP and lateral radiographs, we drilled, measured, placed bicortical  screws proximally and distally as well as locking screws both proximal  and distally. All screws had excellent fixation. Being satisfied,  final fluoroscopic images were taken. We then thoroughly irrigated all  wounds with normal saline. We closed the deep dermal layer using 2-0  Monocryl and skin using 2-0 nylon in horizontal mattress fashion. The  wound was dressed using Xeroform, fluffs, Webril and a well-padded volar  resting splint was applied. The patient was then awoken from general  anesthesia, transferred to PACU in stable condition. I was present for  all aspects of procedure. Meticulous dissection was used and thorough  hemostasis was achieved. There was a tourniquet that was used for less  than 60 minutes. The right distal radius fracture was evaluated  preoperatively. It was determined that nonoperative treatment with  close clinical followup would be best for this injury.       Tamika Horne    D: 12/26/2022 9:17:50       T: 12/26/2022 9:22:04     [email or web address]  Job#: [id number]     Doc#: [id number]    CC:

## 2022-12-26 NOTE — ANESTHESIA PROCEDURE NOTES
Peripheral Block    Patient location during procedure: OR  Reason for block: post-op pain management and at surgeon's request  Start time: 12/26/2022 9:25 AM  End time: 12/26/2022 9:30 AM  Staffing  Performed: anesthesiologist   Anesthesiologist: Juice Chen MD  Preanesthetic Checklist  Completed: patient identified, IV checked, site marked, risks and benefits discussed, surgical/procedural consents, equipment checked, pre-op evaluation, timeout performed, anesthesia consent given, oxygen available, monitors applied/VS acknowledged, fire risk safety assessment completed and verbalized and blood product R/B/A discussed and consented  Peripheral Block   Patient position: supine  Prep: ChloraPrep  Provider prep: mask and sterile gloves  Patient monitoring: cardiac monitor, continuous pulse ox, continuous capnometry and IV access  Block type: Brachial plexus  Infraclavicular  Laterality: left  Injection technique: single-shot  Guidance: ultrasound guided    Needle   Needle type: short-bevel   Needle gauge: 20 G  Needle localization: ultrasound guidance  Needle length: 10 cm  Assessment   Injection assessment: negative aspiration for heme, no paresthesia on injection, local visualized surrounding nerve on ultrasound and no intravascular symptoms  Slow fractionated injection: yes  Hemodynamics: stable  Outcomes: patient tolerated procedure well and uncomplicated    Medications Administered  bupivacaine (PF) 0.5 % - Perineural   20 mL - 12/26/2022 9:25:00 AM

## 2022-12-26 NOTE — DISCHARGE INSTRUCTIONS
No alcoholic beverages, no driving or operating machinery, no making important decisions for 24 hours. Children should maintain quiet play ( games, movies, books ) for 24 hours. You may have a normal diet but should eat lightly day of surgery. Drink plenty of fluids. Urinate within 8 hours after surgery, if unable to urinate call your doctor    Orthopedic Instructions:  -Weight bearing status: Non weight bearing and No lifting, pushing, pulling > 5 lbs for the right arm and left arm  -Keep dressing clean and dry.  -Do not remove dressings or splint  -Maintain wrist brace to right arm, OK to remove for hygiene of right arm  -If splint were to fall off or become saturated, do not attempt to put back on or dry out. Return to ED immediately for reapplication.  -Always look for signs of compartment syndrome: pain out of proportion to the injury, pain not controlled with pain medication, numbness in digits, changing of color of digits (paleness). If these signs occur return to ED immediately for reassessment.   -Always work on motion (to non-injured fingers) while in splint and brace to decrease swelling.  -Dress with plastic bag and rubber band to seal and repeat with second bag and rubber band when showering. \"Press & Seal\" wrap also works for sealing the rubber bag when showering.  -Starting 3 days after surgery, Ok to shower but no soaks in a bath, hot tub, pool, etc  -Ice (20 minutes on and off 1 hour) and elevate above the level of the heart to reduce swelling and throbbing pain. -Drink plenty of fluids.   -Should urinate within 8 hours of surgery.  -Call the office or come to Emergency Room if signs of infection appear (hot, swollen, red, draining pus, fever)  -Take medications as prescribed. -PK to take Motrin (400 mg) every 6 hours with prescribed medications.  -Wean off narcotics (percocet/norco) as soon as possible.  Do not take tylenol if still taking narcotics.  -Follow up with  Dr. Tatyana Cheng  in their office  14  days after surgery. Call [phone] to schedule/confirm. NERVE BLOCK DISCHARGE INSTRUCTIONS    What is a nerve block and how does it work? Nerves control movement, pain and normal sensation. The Anesthesiologist has administered a local anesthetic medication to block normal sensations in the desired nerve ( s). The nerve block can cause feelings such as tingling, weakness, numbness, heaviness or the feeling that your arm or leg has fallen asleep    How long will the nerve block last?    The nerve block can last anywhere from 2-48 hours depending on the medications used. Normal sensation will gradually return. Frequently, weakness goes away first, then numbness or tingling, followed by the return of the feeling of pain. However, these feelings can return in any order. It usually takes 60 minutes for sensation to fully return once the nerve block starts to wear off. If the block does not wear off in 48 hours call the anesthesia department at [phone].

## 2022-12-26 NOTE — H&P
Surgical History and Physical Exam    Reason for surgery:  The patient is a 60 y.o. female with left distal radius fracture    Past Medical History:    Past Medical History:   Diagnosis Date    Cervical dysplasia     Factor 5 Leiden mutation, heterozygous ([address]     No blood thinners, no past clot history per pt. Influenza B 01/16/2018    Pneumonia 01/31/2018     Past Surgical History:    Past Surgical History:   Procedure Laterality Date    CHOLECYSTECTOMY  02/28/2018    robotic    KNEE ARTHROSCOPY      x4 on Right, x1 Left Knee    CT LAP,CHOLECYSTECTOMY N/A 2/28/2018    CHOLECYSTECTOMY LAPAROSCOPIC ROBOTIC [facility] [address] [facility] [address] [facility]       Medications Prior to Admission:   Prior to Admission medications    Medication Sig Start Date End Date Taking? Authorizing Provider   oxyCODONE (ROXICODONE) 5 MG immediate release tablet Take 1 tablet by mouth every 6 hours as needed for Pain for up to 3 days. Max Daily Amount: 20 mg 12/23/22 12/26/22  Dajuan Jaimes DO     Allergies:    Patient has no known allergies. Social History:   Social History     Socioeconomic History    Marital status: Single     Spouse name: None    Number of children: None    Years of education: None    Highest education level: None   Tobacco Use    Smoking status: Every Day     Packs/day: 1.00     Types: Cigarettes    Smokeless tobacco: Never   Vaping Use    Vaping Use: Former   Substance and Sexual Activity    Alcohol use: No    Drug use: No     Family History:  History reviewed. No pertinent family history. REVIEW OF SYSTEMS:  Constitutional: Negative for fever and chills. HENT: Negative for congestion or drainage   Eyes: Negative for blurred vision and double vision. Respiratory: Negative for cough, shortness of breath and wheezing. Cardiovascular: Negative for chest pain and palpitations. Gastrointestinal: Negative for nausea. Negative for vomiting.    Musculoskeletal: Positive for myalgias and joint pain. Skin: Negative for itching and rash. Neurological: Negative for dizziness, sensory change and headaches. Psychiatric/Behavioral: Negative for depression and suicidal ideas. PHYSICAL EXAM:  Blood pressure (!) 143/64, pulse 60, temperature 97.9 °F (36.6 °C), temperature source Temporal, resp. rate 20, height 6' 3\" (1.905 m), weight 160 lb (72.6 kg), SpO2 96 %. Gen: alert and oriented, NAD, cooperative  Head: normocephalic atraumatic   Cardiovascular: Regular rate, no dependent edema, distal pulses 2+  Respiratory: Chest symmetric, no accessory muscle use, normal respirations  MSK:   LUE: Splint on, c/d/I. Appropriately TTP about the wrist. 2+ rad pulse. Median/Radial/Ulnar/AIN/PIN motor intact. Median/Radial/Ulnar nerve SILT. A/P: 60 y.o. female  was evaluated and after discussion surgical and non surgical options, the patient has decided to undergo open reduction with internal fixation of left distal radius fracture  Consent obtained and in chart. All questions answered appropriately. Surgical risks including but not limited to: bleeding, blood clots, infection, damage to surrounding tissues/nerves/vessels, failure of fixation, failure of wounds to heal, loss of motion, stiffness, dislocation, postoperative pain, recurrence of symptoms, need for future surgery,  risks of anesthesia, loss of limb and loss of life were all discussed with the patient. Knowing these risks, the patient wishes to proceed with surgery. -Abx OCTOR  -Site marked, Consent in chart  -AC held  -NPO since midnight  -All questions answered. Homero Elizabeth.  Olivier Garvin DO  Orthopedic Surgery Resident  [address] [place] [address]

## 2023-01-11 ENCOUNTER — OFFICE VISIT (OUTPATIENT)
Dept: ORTHOPEDIC SURGERY | Age: 63
End: 2023-01-11

## 2023-01-11 VITALS — BODY MASS INDEX: 21.67 KG/M2 | WEIGHT: 160 LBS | HEIGHT: 72 IN

## 2023-01-11 DIAGNOSIS — S52.501D CLOSED FRACTURE OF DISTAL END OF RIGHT RADIUS WITH ROUTINE HEALING, UNSPECIFIED FRACTURE MORPHOLOGY, SUBSEQUENT ENCOUNTER: ICD-10-CM

## 2023-01-11 DIAGNOSIS — S52.502D CLOSED FRACTURE OF DISTAL END OF LEFT RADIUS WITH ROUTINE HEALING, UNSPECIFIED FRACTURE MORPHOLOGY, SUBSEQUENT ENCOUNTER: Primary | ICD-10-CM

## 2023-01-11 NOTE — PATIENT INSTRUCTIONS
- Clean incisions and/or wounds with soap and water daily then apply clean dressings until wounds are healed and dry. If steri strips are in place, leave in place until they naturally fall off. Avoid soaks of any kind (bath, hot tub, pool, lake, etc.).     - No lifting, pulling or pushing greater than 5 lbs with bilateral upper extremities. Remain in the wrist braces at all times except for removal for hygiene. Encouraged to work on finger range of motion.

## 2023-01-11 NOTE — PROGRESS NOTES
[facility]  1943 [address] [facility]– [place]  Dept Phone: [phone]  Dept Fax: [phone]      Orthopaedic Trauma Clinic Follow Up      Subjective:   Date of Surgery: 12/26/2022    Aston Moran is a 60y.o. year old female who presents to the clinic today for routine follow up 3 weeks post operatively from open reduction internal fixation of her left comminuted intra-articular distal radius fracture and closed reduction of her right extra-articular distal radius fracture. Patient presents today with her . Patient states she has been removing the wrist brace on the right wrist intermittently throughout the day and performing activities of daily living with moderate pain in the wrist.  States she does not think she has lifted anything heavier than 5 pounds with the right upper extremity and has been using pain as her guidance. She has maintained the splint to the left upper extremity and has remained nonweightbearing. She states she has generalized tingling throughout the left hand which she attributes to swelling but denies any other numbness or tingling. Denies any new injuries or falls. Review of Systems  Gen: no fever, chills, malaise  CV: no chest pain or palpitations  Resp: no cough or shortness of breath  GI: no nausea, vomiting, diarrhea, or constipation  Neuro: no seizures, vertigo, or headache  Msk: bilateral wrist pain   10 remaining systems reviewed and negative    Objective : There were no vitals filed for this visit. Body mass index is 20 kg/m². General: No acute distress, resting comfortably in the clinic  Neuro: alert. oriented  Eyes: Extra-ocular muscles intact  Pulm: Respirations unlabored and regular. Skin: warm, well perfused  Psych:   Patient has good fund of knowledge and displays understanding of exam, diagnosis, and plan. LUE: Skin intact. Surgical incision healing appropriately without evidence of dehiscence, drainage or erythema. Swelling about the wrist and digits appropriate post operatively. Compartments soft. 2+ rad pulse. Median/Radial/Ulnar/AIN/PIN motor intact. Median/Radial/Ulnar nerve SILT with globally dysesthesias about all the digits. RUE:  Skin intact. No swelling, ecchymosis, erythema or wounds. Minimally TTP about the fracture site. Able to make a complete fist. Approximately 85 degrees of supination and pronation. Compartments soft. 2+ rad pulse. Median/Radial/Ulnar/AIN/PIN motor intact. Median/Radial/Ulnar nerve SILT. Radiology:  Imaging studies from today were independently reviewed and read as listed below. Any relevant images obtained prior to today's visit were also independently interpreted. History: Left intra-articular distal radius fracture s/p ORIF    Comparison: 12/26/2022    Findings: 3 views of the left wrist (AP, oblique, lateral) in a skeletally mature patient showing internal fixation of a comminuted distal radius fracture with a dorsal spanning plate extending from the second metacarpal shaft to the distal third radial shaft. No acute hardware complications or loss of fixation. No new fractures or dislocations. Impression: Stable ORIF left distal radius fracture. History: Right extra-articular distal radius fracture    Comparison: 12/26/2022    Findings: 3 views of the right wrist (AP, oblique, lateral) in a skeletally mature patient re-demonstrating a minimally displaced distal radial metaphyseal fracture with no significant change in alignment, angulation or further displacement compared to previous imaging. Minimal new callus formation compared to previous imaging. No new fractures or dislocations identified. Impression: Stable right distal radius fracture.      Assessment:   60y.o. year old female with ORIF left intra-articular distal radius fracture; DOS: 12/26/2022; Closed treatment right extra-articular distal radius fracture  Plan:   - Clean incisions and/or wounds with soap and water daily then apply clean dressings until wounds are healed and dry. Steri strips placed, leave in place until they naturally fall off. Avoid soaks of any kind (bath, hot tub, pool, lake, etc.).     - No lifting, pulling or pushing greater than 5 lbs with bilateral upper extremities. Remain in the wrist braces at all times except for removal for hygiene. Encouraged to work on finger range of motion.     - Plan to f/u in 4 weeks with repeat x-rays. At that time, will get patient scheduled for operative intervention for left upper extremity for removal of dorsal spanning plate, as well as allow patient to begin heavier activities with her right upper extremity if adequate signs of healing. Follow up:Return in about 5 weeks (around 2/15/2023) for x-rays out of cast/splint/brace. No orders of the defined types were placed in this encounter. Orders Placed This Encounter   Procedures    XR WRIST RIGHT (MIN 3 VIEWS)     Standing Status:   Future     Number of Occurrences:   1     Standing Expiration Date:   1/10/2024     Order Specific Question:   Reason for exam:     Answer:   monitor    XR WRIST LEFT (MIN 3 VIEWS)     Standing Status:   Future     Number of Occurrences:   1     Standing Expiration Date:   1/10/2024     Order Specific Question:   Reason for exam:     Answer:   monitor       Electronically signed by Yamila Ruelas DO on 1/12/2023 at 8:28 AM    This note is created with the assistance of a speech recognition program.  While intending to generate a document that actually reflects the content of the visit, the document can still have some errors including those of syntax and sound a like substitutions which may escape proof reading.   In such instances, actual meaning can be extrapolated by contextual diversion

## 2023-02-15 ENCOUNTER — OFFICE VISIT (OUTPATIENT)
Dept: ORTHOPEDIC SURGERY | Age: 63
End: 2023-02-15
Payer: OTHER GOVERNMENT

## 2023-02-15 VITALS — WEIGHT: 160 LBS | BODY MASS INDEX: 21.67 KG/M2 | HEIGHT: 72 IN

## 2023-02-15 DIAGNOSIS — S52.502D CLOSED FRACTURE OF DISTAL END OF LEFT RADIUS WITH ROUTINE HEALING, UNSPECIFIED FRACTURE MORPHOLOGY, SUBSEQUENT ENCOUNTER: Primary | ICD-10-CM

## 2023-02-15 DIAGNOSIS — S52.501D CLOSED FRACTURE OF DISTAL END OF RIGHT RADIUS WITH ROUTINE HEALING, UNSPECIFIED FRACTURE MORPHOLOGY, SUBSEQUENT ENCOUNTER: ICD-10-CM

## 2023-02-15 PROCEDURE — 99024 POSTOP FOLLOW-UP VISIT: CPT | Performed by: STUDENT IN AN ORGANIZED HEALTH CARE EDUCATION/TRAINING PROGRAM

## 2023-02-16 NOTE — PROGRESS NOTES
[address] [facility]  [facility] [place] [address]  Dept: [phone]  Dept Fax: [phone]        Orthopaedic Trauma Clinic Follow Up      Subjective:   Date of Surgery: 12/26/2022    Tristin Hernandez is a 60y.o. year old female who presents to the clinic today for routine followup regarding her open reduction with internal fixation of her left intraarticular distal radius fracture and her closed reduction of her right extra articular distal radius fracture. Patient had a dorsal wrist spanning plate placed on the left side on 12/26/2022. Patient is seen and evaluated in clinic today. She states her right wrist causes her minimal pain. She has improved range of motion and she has increased the use of the wrist due to her left wrist being stiff due to the plate. She is interested to know when the plate can be removed on her left wrist. She denies new trauma, numbness, tingling into the bilateral upper extremities. Review of Systems  Gen: no fever, chills, malaise  CV: no chest pain or palpitations  Resp: no cough or shortness of breath  GI: no nausea, vomiting, diarrhea, or constipation  Neuro: no numbness, tingling, or weakness  Msk: Negative for myalgias in bilateral upper extremities. 10 remaining systems reviewed and negative    Objective : There were no vitals filed for this visit. Body mass index is 20 kg/m². General: No acute distress, resting comfortably in the clinic  Neuro: alert. oriented  Eyes: Extra-ocular muscles intact  Pulm: Respirations unlabored and regular. Skin: warm, well perfused  Psych:   Patient has good fund of knowledge and displays understanging of exam, diagnosis, and plan. MSK:    RUE: No gross deformities, wounds or lacerations. Minimally tender to palpation about the right wrist. Patient able to flex and extend wrist to 60 degrees. Approximately 5 degrees of radial and ulnar deviation.  Compartments soft.  Ulnar/Median/AIN/PIN motor intact. C5-T1 SILT. Hand is warm and well perfused with BCR.    LUE: No gross deformities, well healed surgical incisions present to the dorsum of the hand and forearm. Tender to palpation overlying dorsal plate. Non tender to palpation about the distal radius. Minimal motion at wrist. Compartments soft. Ulnar/Median/AIN/PIN motor intact. C5-T1 SILT. The hand is warm and well perfused. Radiology:  Right Wrist  History: Right Distal radius fracture s/p closed reduction    Comparison: 1/11/2023    Findings: 3 radiographic views of the right wrist AP, oblique, lateral demonstrating continued healing of the right distal radius fracture with no acute osseous abnormalities, dislocations or new fractures compared to previous radiographs.  1/11/2023    Impression: Healing right distal radius fracture    Left Wrist  History: Left distal radius fracture s/p Dorsal spanning plate    Comparison:   1/11/23    Findings:   3 radiographic views of the left wrist AP, oblique, lateral demonstrating orthopedic hardware without evidence of failure, loosening or displacement. Evidence of a healing left distal radius fracture. No new acute osseous dislocations or fractures appreciated when compared to previous radiographs.  1/11/2023    Impression:  Healing left distal radius fracture with orthopedic hardware     Assessment:   60y.o. year old female with Bilateral distal radius fractures    Right distal radius fracture s/p closed reduction  Left distal radius fracture s/p dorsal spanning plate    Plan:      Ms. Ewa Li is a pleasant 61 yo female with bilateral distal radius fractures. She is improving overall. Her right wrist has progressed significantly. She has minimal pain with xrays demonstrating continued healing. Continue range of motion right upper extremity, patient may begin resisted activities with the right upper extremity greater than 5 pounds as tolerated.       She is approximately 7 weeks out from a left wrist dorsal spanning plate. She shows evidence of continued healing. At this point in time I believe it would be appropriate to peruse hardware removal from the left wrist. Once removed we will have the patient start occupational therapy. After thorough discussion of surgical and non surgical options, the patient has decided to undergo Left Distal Radius Hardware removal. Consent obtained and in chart. All questions answered appropriately. Surgical risks including but not limited to: bleeding, blood clots, infection, damage to surrounding tissues/nerves/vessels, failure of union, failure of wounds to heal, loss of motion, scar formation, patient dissatisfaction, residual pain, stiffness, dislocation, postoperative pain, recurrence of symptoms, need for future surgery,  risks of anesthesia, loss of limb and loss of life were all discussed with the patient. Knowing these risks, the patient wishes to proceed with surgery. We will have her meet with surgery scheduling and plan to remove the plate next week. The patient voices understanding and agrees with this plan. She will follow up in our office 14 days following dorsal spanning place removal from the left upper extremity. Follow up:No follow-ups on file. No orders of the defined types were placed in this encounter.          Orders Placed This Encounter   Procedures    XR WRIST LEFT (MIN 3 VIEWS)     Standing Status:   Future     Number of Occurrences:   1     Standing Expiration Date:   2/14/2024    XR WRIST RIGHT (MIN 3 VIEWS)     Standing Status:   Future     Number of Occurrences:   1     Standing Expiration Date:   2/14/2024       Electronically signed by Candice Stewart DO on 2/20/2023 at 8:30 AM

## 2023-02-22 ENCOUNTER — ANESTHESIA EVENT (OUTPATIENT)
Dept: OPERATING ROOM | Age: 63
End: 2023-02-22
Payer: OTHER GOVERNMENT

## 2023-02-23 ENCOUNTER — HOSPITAL ENCOUNTER (OUTPATIENT)
Age: 63
Setting detail: OUTPATIENT SURGERY
Discharge: HOME | End: 2023-02-23
Attending: STUDENT IN AN ORGANIZED HEALTH CARE EDUCATION/TRAINING PROGRAM | Admitting: STUDENT IN AN ORGANIZED HEALTH CARE EDUCATION/TRAINING PROGRAM
Payer: OTHER GOVERNMENT

## 2023-02-23 ENCOUNTER — APPOINTMENT (OUTPATIENT)
Dept: GENERAL RADIOLOGY | Age: 63
End: 2023-02-23
Attending: STUDENT IN AN ORGANIZED HEALTH CARE EDUCATION/TRAINING PROGRAM
Payer: OTHER GOVERNMENT

## 2023-02-23 ENCOUNTER — ANESTHESIA (OUTPATIENT)
Dept: OPERATING ROOM | Age: 63
End: 2023-02-23
Payer: OTHER GOVERNMENT

## 2023-02-23 VITALS
HEIGHT: 72 IN | BODY MASS INDEX: 21.67 KG/M2 | OXYGEN SATURATION: 95 % | SYSTOLIC BLOOD PRESSURE: 120 MMHG | DIASTOLIC BLOOD PRESSURE: 93 MMHG | HEART RATE: 78 BPM | RESPIRATION RATE: 20 BRPM | WEIGHT: 160 LBS | TEMPERATURE: 96.8 F

## 2023-02-23 DIAGNOSIS — G89.18 POST-OP PAIN: Primary | ICD-10-CM

## 2023-02-23 PROCEDURE — 20680 REMOVAL OF IMPLANT DEEP: CPT | Performed by: STUDENT IN AN ORGANIZED HEALTH CARE EDUCATION/TRAINING PROGRAM

## 2023-02-23 PROCEDURE — 3700000000 HC ANESTHESIA ATTENDED CARE: Performed by: STUDENT IN AN ORGANIZED HEALTH CARE EDUCATION/TRAINING PROGRAM

## 2023-02-23 PROCEDURE — 3209999900 FLUORO FOR SURGICAL PROCEDURES

## 2023-02-23 PROCEDURE — 2500000003 HC RX 250 WO HCPCS: Performed by: NURSE ANESTHETIST, CERTIFIED REGISTERED

## 2023-02-23 PROCEDURE — 2709999900 HC NON-CHARGEABLE SUPPLY: Performed by: STUDENT IN AN ORGANIZED HEALTH CARE EDUCATION/TRAINING PROGRAM

## 2023-02-23 PROCEDURE — 25259 MANIPULATE WRIST W/ANESTHES: CPT | Performed by: STUDENT IN AN ORGANIZED HEALTH CARE EDUCATION/TRAINING PROGRAM

## 2023-02-23 PROCEDURE — 3600000004 HC SURGERY LEVEL 4 BASE: Performed by: STUDENT IN AN ORGANIZED HEALTH CARE EDUCATION/TRAINING PROGRAM

## 2023-02-23 PROCEDURE — 7100000011 HC PHASE II RECOVERY - ADDTL 15 MIN: Performed by: STUDENT IN AN ORGANIZED HEALTH CARE EDUCATION/TRAINING PROGRAM

## 2023-02-23 PROCEDURE — 2580000003 HC RX 258: Performed by: ANESTHESIOLOGY

## 2023-02-23 PROCEDURE — 77071 MNL APPL STRS JT RADIOGRAPHY: CPT | Performed by: STUDENT IN AN ORGANIZED HEALTH CARE EDUCATION/TRAINING PROGRAM

## 2023-02-23 PROCEDURE — 2580000003 HC RX 258: Performed by: STUDENT IN AN ORGANIZED HEALTH CARE EDUCATION/TRAINING PROGRAM

## 2023-02-23 PROCEDURE — 3700000001 HC ADD 15 MINUTES (ANESTHESIA): Performed by: STUDENT IN AN ORGANIZED HEALTH CARE EDUCATION/TRAINING PROGRAM

## 2023-02-23 PROCEDURE — 6360000002 HC RX W HCPCS: Performed by: ANESTHESIOLOGY

## 2023-02-23 PROCEDURE — 6360000002 HC RX W HCPCS: Performed by: STUDENT IN AN ORGANIZED HEALTH CARE EDUCATION/TRAINING PROGRAM

## 2023-02-23 PROCEDURE — 6360000002 HC RX W HCPCS: Performed by: NURSE ANESTHETIST, CERTIFIED REGISTERED

## 2023-02-23 PROCEDURE — 7100000010 HC PHASE II RECOVERY - FIRST 15 MIN: Performed by: STUDENT IN AN ORGANIZED HEALTH CARE EDUCATION/TRAINING PROGRAM

## 2023-02-23 PROCEDURE — 7100000001 HC PACU RECOVERY - ADDTL 15 MIN: Performed by: STUDENT IN AN ORGANIZED HEALTH CARE EDUCATION/TRAINING PROGRAM

## 2023-02-23 PROCEDURE — 7100000000 HC PACU RECOVERY - FIRST 15 MIN: Performed by: STUDENT IN AN ORGANIZED HEALTH CARE EDUCATION/TRAINING PROGRAM

## 2023-02-23 PROCEDURE — 3600000014 HC SURGERY LEVEL 4 ADDTL 15MIN: Performed by: STUDENT IN AN ORGANIZED HEALTH CARE EDUCATION/TRAINING PROGRAM

## 2023-02-23 PROCEDURE — 6370000000 HC RX 637 (ALT 250 FOR IP): Performed by: ANESTHESIOLOGY

## 2023-02-23 RX ORDER — FENTANYL CITRATE 50 UG/ML
INJECTION, SOLUTION INTRAMUSCULAR; INTRAVENOUS PRN
Status: DISCONTINUED | OUTPATIENT
Start: 2023-02-23 | End: 2023-02-23 | Stop reason: SDUPTHER

## 2023-02-23 RX ORDER — MAGNESIUM HYDROXIDE 1200 MG/15ML
LIQUID ORAL CONTINUOUS PRN
Status: COMPLETED | OUTPATIENT
Start: 2023-02-23 | End: 2023-02-23

## 2023-02-23 RX ORDER — LIDOCAINE HYDROCHLORIDE 10 MG/ML
INJECTION, SOLUTION EPIDURAL; INFILTRATION; INTRACAUDAL; PERINEURAL PRN
Status: DISCONTINUED | OUTPATIENT
Start: 2023-02-23 | End: 2023-02-23 | Stop reason: SDUPTHER

## 2023-02-23 RX ORDER — ROCURONIUM BROMIDE 10 MG/ML
INJECTION, SOLUTION INTRAVENOUS PRN
Status: DISCONTINUED | OUTPATIENT
Start: 2023-02-23 | End: 2023-02-23 | Stop reason: SDUPTHER

## 2023-02-23 RX ORDER — SODIUM CHLORIDE 0.9 % (FLUSH) 0.9 %
5-40 SYRINGE (ML) INJECTION EVERY 12 HOURS SCHEDULED
Status: DISCONTINUED | OUTPATIENT
Start: 2023-02-23 | End: 2023-02-23 | Stop reason: HOSPADM

## 2023-02-23 RX ORDER — DEXAMETHASONE SODIUM PHOSPHATE 10 MG/ML
INJECTION INTRAMUSCULAR; INTRAVENOUS PRN
Status: DISCONTINUED | OUTPATIENT
Start: 2023-02-23 | End: 2023-02-23 | Stop reason: SDUPTHER

## 2023-02-23 RX ORDER — DROPERIDOL 2.5 MG/ML
0.62 INJECTION, SOLUTION INTRAMUSCULAR; INTRAVENOUS
Status: DISCONTINUED | OUTPATIENT
Start: 2023-02-23 | End: 2023-02-23 | Stop reason: HOSPADM

## 2023-02-23 RX ORDER — MEPERIDINE HYDROCHLORIDE 50 MG/ML
12.5 INJECTION INTRAMUSCULAR; INTRAVENOUS; SUBCUTANEOUS EVERY 5 MIN PRN
Status: DISCONTINUED | OUTPATIENT
Start: 2023-02-23 | End: 2023-02-23 | Stop reason: HOSPADM

## 2023-02-23 RX ORDER — SODIUM CHLORIDE, SODIUM LACTATE, POTASSIUM CHLORIDE, CALCIUM CHLORIDE 600; 310; 30; 20 MG/100ML; MG/100ML; MG/100ML; MG/100ML
INJECTION, SOLUTION INTRAVENOUS CONTINUOUS
Status: DISCONTINUED | OUTPATIENT
Start: 2023-02-23 | End: 2023-02-23 | Stop reason: HOSPADM

## 2023-02-23 RX ORDER — METOCLOPRAMIDE HYDROCHLORIDE 5 MG/ML
10 INJECTION INTRAMUSCULAR; INTRAVENOUS
Status: DISCONTINUED | OUTPATIENT
Start: 2023-02-23 | End: 2023-02-23 | Stop reason: HOSPADM

## 2023-02-23 RX ORDER — IPRATROPIUM BROMIDE AND ALBUTEROL SULFATE 2.5; .5 MG/3ML; MG/3ML
1 SOLUTION RESPIRATORY (INHALATION) ONCE
Status: COMPLETED | OUTPATIENT
Start: 2023-02-23 | End: 2023-02-23

## 2023-02-23 RX ORDER — MIDAZOLAM HYDROCHLORIDE 1 MG/ML
INJECTION INTRAMUSCULAR; INTRAVENOUS PRN
Status: DISCONTINUED | OUTPATIENT
Start: 2023-02-23 | End: 2023-02-23 | Stop reason: SDUPTHER

## 2023-02-23 RX ORDER — OXYCODONE HYDROCHLORIDE AND ACETAMINOPHEN 5; 325 MG/1; MG/1
1 TABLET ORAL EVERY 6 HOURS PRN
Qty: 20 TABLET | Refills: 0 | Status: SHIPPED | OUTPATIENT
Start: 2023-02-23 | End: 2023-02-28

## 2023-02-23 RX ORDER — SODIUM CHLORIDE 0.9 % (FLUSH) 0.9 %
5-40 SYRINGE (ML) INJECTION PRN
Status: DISCONTINUED | OUTPATIENT
Start: 2023-02-23 | End: 2023-02-23 | Stop reason: HOSPADM

## 2023-02-23 RX ORDER — SODIUM CHLORIDE 9 MG/ML
25 INJECTION, SOLUTION INTRAVENOUS PRN
Status: DISCONTINUED | OUTPATIENT
Start: 2023-02-23 | End: 2023-02-23 | Stop reason: HOSPADM

## 2023-02-23 RX ORDER — LABETALOL HYDROCHLORIDE 5 MG/ML
INJECTION, SOLUTION INTRAVENOUS PRN
Status: DISCONTINUED | OUTPATIENT
Start: 2023-02-23 | End: 2023-02-23 | Stop reason: SDUPTHER

## 2023-02-23 RX ORDER — HYDRALAZINE HYDROCHLORIDE 20 MG/ML
10 INJECTION INTRAMUSCULAR; INTRAVENOUS
Status: DISCONTINUED | OUTPATIENT
Start: 2023-02-23 | End: 2023-02-23 | Stop reason: HOSPADM

## 2023-02-23 RX ORDER — DIPHENHYDRAMINE HYDROCHLORIDE 50 MG/ML
12.5 INJECTION INTRAMUSCULAR; INTRAVENOUS
Status: DISCONTINUED | OUTPATIENT
Start: 2023-02-23 | End: 2023-02-23 | Stop reason: HOSPADM

## 2023-02-23 RX ORDER — ONDANSETRON 2 MG/ML
INJECTION INTRAMUSCULAR; INTRAVENOUS PRN
Status: DISCONTINUED | OUTPATIENT
Start: 2023-02-23 | End: 2023-02-23 | Stop reason: SDUPTHER

## 2023-02-23 RX ORDER — PROPOFOL 10 MG/ML
INJECTION, EMULSION INTRAVENOUS PRN
Status: DISCONTINUED | OUTPATIENT
Start: 2023-02-23 | End: 2023-02-23 | Stop reason: SDUPTHER

## 2023-02-23 RX ADMIN — MIDAZOLAM 2 MG: 1 INJECTION INTRAMUSCULAR; INTRAVENOUS at 08:20

## 2023-02-23 RX ADMIN — Medication 2000 MG: at 08:38

## 2023-02-23 RX ADMIN — PROPOFOL 50 MG: 10 INJECTION, EMULSION INTRAVENOUS at 08:25

## 2023-02-23 RX ADMIN — IPRATROPIUM BROMIDE AND ALBUTEROL SULFATE 1 AMPULE: .5; 2.5 SOLUTION RESPIRATORY (INHALATION) at 07:27

## 2023-02-23 RX ADMIN — FENTANYL CITRATE 100 MCG: 50 INJECTION, SOLUTION INTRAMUSCULAR; INTRAVENOUS at 08:23

## 2023-02-23 RX ADMIN — SUGAMMADEX 200 MG: 100 INJECTION, SOLUTION INTRAVENOUS at 09:09

## 2023-02-23 RX ADMIN — ROCURONIUM BROMIDE 50 MG: 10 INJECTION, SOLUTION INTRAVENOUS at 08:24

## 2023-02-23 RX ADMIN — FENTANYL CITRATE 50 MCG: 50 INJECTION, SOLUTION INTRAMUSCULAR; INTRAVENOUS at 09:21

## 2023-02-23 RX ADMIN — LIDOCAINE HYDROCHLORIDE 50 MG: 10 INJECTION, SOLUTION EPIDURAL; INFILTRATION; INTRACAUDAL; PERINEURAL at 08:23

## 2023-02-23 RX ADMIN — HYDROMORPHONE HYDROCHLORIDE 0.25 MG: 1 INJECTION, SOLUTION INTRAMUSCULAR; INTRAVENOUS; SUBCUTANEOUS at 09:45

## 2023-02-23 RX ADMIN — PROPOFOL 150 MG: 10 INJECTION, EMULSION INTRAVENOUS at 08:23

## 2023-02-23 RX ADMIN — DEXAMETHASONE SODIUM PHOSPHATE 5 MG: 10 INJECTION INTRAMUSCULAR; INTRAVENOUS at 08:45

## 2023-02-23 RX ADMIN — SODIUM CHLORIDE, POTASSIUM CHLORIDE, SODIUM LACTATE AND CALCIUM CHLORIDE: 600; 310; 30; 20 INJECTION, SOLUTION INTRAVENOUS at 07:37

## 2023-02-23 RX ADMIN — Medication 5 MG: at 09:05

## 2023-02-23 RX ADMIN — ONDANSETRON 4 MG: 2 INJECTION INTRAMUSCULAR; INTRAVENOUS at 08:45

## 2023-02-23 ASSESSMENT — PAIN SCALES - WONG BAKER: WONGBAKER_NUMERICALRESPONSE: 6

## 2023-02-23 ASSESSMENT — PAIN DESCRIPTION - LOCATION
LOCATION: ARM
LOCATION: ARM

## 2023-02-23 ASSESSMENT — PAIN DESCRIPTION - PAIN TYPE: TYPE: SURGICAL PAIN

## 2023-02-23 ASSESSMENT — PAIN DESCRIPTION - ORIENTATION
ORIENTATION: LEFT
ORIENTATION: LEFT

## 2023-02-23 ASSESSMENT — PAIN DESCRIPTION - DESCRIPTORS
DESCRIPTORS: ACHING;THROBBING
DESCRIPTORS: ACHING

## 2023-02-23 ASSESSMENT — LIFESTYLE VARIABLES: SMOKING_STATUS: 1

## 2023-02-23 ASSESSMENT — PAIN SCALES - GENERAL
PAINLEVEL_OUTOF10: 4
PAINLEVEL_OUTOF10: 3

## 2023-02-23 ASSESSMENT — PAIN - FUNCTIONAL ASSESSMENT: PAIN_FUNCTIONAL_ASSESSMENT: NONE - DENIES PAIN

## 2023-02-23 NOTE — DISCHARGE INSTRUCTIONS
Orthopedic Instructions:  - Weight bearing status: Activities as tolerated, ok to come out of brace for range of motion    - Keep dressing clean and dry. - Dress with plastic bag and rubber band to seal and repeat with second bag and rubber band when showering. \"Press & Seal\" wrap also works for sealing the rubber bag when showering.    - Starting 5 days after surgery, Ok for daily dressing changes until wound is dry. Then leave open to air, If wound is no longer leaking. Ok to shower but no soaks or baths. - Always work on fingers motion (to non-injured fingers) while in brace to decrease swelling.    - Ice (20 minutes on and off 1 hour) and elevate above the level of the heart to reduce swelling and throbbing pain. - Drink plenty of fluids. - Take medications as prescribed. - Wean off narcotics (percocet/norco) as soon as possible. Do not take tylenol if still taking narcotics. - No alcoholic beverages or driving/operating machinery while on narcotics. - Call the office or come to Emergency Room if signs of infection appear (hot, swollen, red, draining pus, fever). - Follow up with Dr. Marcello Richey in his office in 10-14 days after surgery/discharge. Call [phone]   to schedule.

## 2023-02-23 NOTE — ANESTHESIA PRE PROCEDURE
Department of Anesthesiology  Preprocedure Note       Name:  Nargis Zavala   Age:  60 y.o.  :  1960                                          MRN:  [id number]         Date:  2023      Surgeon: Jordin Emery):  Lisa Chase DO    Procedure: Procedure(s):  DISTAL RADIUS HARDWARE REMOVAL  (30[address] HAND TABLE, SUPINE, C-ARM)    Medications prior to admission:   Prior to Admission medications    Medication Sig Start Date End Date Taking? Authorizing Provider   vitamin C (ASCORBIC ACID) 500 MG tablet Take 1 tablet by mouth daily 22  Tej Ruiz DO       Current medications:    No current facility-administered medications for this encounter. Allergies:  No Known Allergies    Problem List:    Patient Active Problem List   Diagnosis Code    Pneumonia J18.9    Chronic obstructive pulmonary disease with acute exacerbation (HCC) J44.1    PVC (premature ventricular contraction) I49.3    Liver enzyme elevation R74.8    Steroid-induced hyperglycemia R73.9, T38.0X5A    Anemia D64.9    Closed fracture of left distal radius S52.502A    Closed fracture of right distal radius S52.501A       Past Medical History:        Diagnosis Date    Cervical dysplasia     Factor 5 Leiden mutation, heterozygous ([facility] [address]     No blood thinners, no past clot history per pt. Diagnosed via bloodwork due to family hx.  Influenza B 2018    Pneumonia 2018    Bilateral    Shingles 2021    Tricuspid regurgitation     Mild to moderate ---- EF 65%    Under care of team     ORTHO - DR. Charla Boone - last visit 2/15/2023    Under care of team     PCP - DR. Rebeka Vargas - last visit 2021    Wears dentures     upper       Past Surgical History:        Procedure Laterality Date    FOREARM SURGERY Left 2022    RADIUS OPEN REDUCTION INTERNAL FIXATION*SYNTHES DORSAL SPANNING PLATE, C-ARM, APPLICATION OF SPLINT performed by Lisa Chase DO at [address] Right 2022    Closed reduction rt distal radius    KNEE ARTHROSCOPY      x4 on Right, x1 Left Knee    TX LAPAROSCOPY SURG CHOLECYSTECTOMY N/A 02/28/2018    CHOLECYSTECTOMY LAPAROSCOPIC ROBOTIC performed by Bernard Diaz MD at [address]         Social History:    Social History     Tobacco Use    Smoking status: Every Day     Packs/day: 1.00     Types: Cigarettes    Smokeless tobacco: Never   Substance Use Topics    Alcohol use: No                                Ready to quit: Not Answered  Counseling given: Not Answered      Vital Signs (Current):   Vitals:    02/22/23 1125   Weight: 160 lb (72.6 kg)   Height: 6' 3\" (1.905 m)                                              BP Readings from Last 3 Encounters:   12/26/22 (!) 154/64   12/23/22 (!) 107/49   11/08/21 (!) 159/86       NPO Status:                                                                                 BMI:   Wt Readings from Last 3 Encounters:   02/22/23 160 lb (72.6 kg)   02/15/23 160 lb (72.6 kg)   01/11/23 160 lb (72.6 kg)     Body mass index is 20 kg/m².     CBC:   Lab Results   Component Value Date/Time    WBC 10.6 02/04/2018 05:55 AM    RBC 3.48 02/04/2018 05:55 AM    HGB 11.0 02/04/2018 05:55 AM    HCT 33.0 02/04/2018 05:55 AM    MCV 94.7 02/04/2018 05:55 AM    RDW 13.4 02/04/2018 05:55 AM     02/04/2018 05:55 AM       CMP:   Lab Results   Component Value Date/Time     02/04/2018 05:55 AM    K 4.6 02/04/2018 05:55 AM    CL 99 02/04/2018 05:55 AM    CO2 26 02/04/2018 05:55 AM    BUN 14 02/04/2018 05:55 AM    CREATININE 0.42 02/04/2018 05:55 AM    GFRAA >60 02/04/2018 05:55 AM    LABGLOM >60 02/04/2018 05:55 AM    GLUCOSE 116 02/04/2018 05:55 AM    PROT 5.7 02/04/2018 05:55 AM    CALCIUM 8.7 02/04/2018 05:55 AM    BILITOT 0.34 02/04/2018 05:55 AM    ALKPHOS 61 02/04/2018 05:55 AM    AST 17 02/04/2018 05:55 AM    ALT 28 02/04/2018 05:55 AM       POC Tests: No results for input(s): POCGLU, POCNA, POCK, POCCL, Wilfredo Reese, POCHCT in the last 72 hours. Coags: No results found for: PROTIME, INR, APTT    HCG (If Applicable): No results found for: PREGTESTUR, PREGSERUM, HCG, HCGQUANT     ABGs: No results found for: PHART, PO2ART, YCC7UYI, HLF0GSP, BEART, I6GHSCFY     Type & Screen (If Applicable):  No results found for: LABABO, LABRH    Drug/Infectious Status (If Applicable):  Lab Results   Component Value Date/Time    HEPCAB NONREACTIVE 02/02/2018 01:51 PM       COVID-19 Screening (If Applicable): No results found for: COVID19        Anesthesia Evaluation  Patient summary reviewed and Nursing notes reviewed no history of anesthetic complications:   Airway: Mallampati: IV  TM distance: >3 FB   Neck ROM: full  Mouth opening: > = 3 FB   Dental:    (+) upper dentures and lower dentures      Pulmonary:   (+) COPD:  decreased breath sounds: bilateral wheezes: scattered current smoker                           Cardiovascular:Negative CV ROS                      Neuro/Psych:               GI/Hepatic/Renal: Neg GI/Hepatic/Renal ROS            Endo/Other: Negative Endo/Other ROS                    Abdominal:             Vascular: Other Findings:           Anesthesia Plan      general     ASA 2     (Nerve block)        Anesthetic plan and risks discussed with patient. Plan discussed with CRNA.                     Gina Naranjo MD   2/23/2023

## 2023-02-23 NOTE — PROGRESS NOTES
Merlinda Rooks called and confirmed that he is on his way to the hospital and will be taking patient home and staying with her for 24 hours. Number is [phone].

## 2023-02-23 NOTE — H&P
History and Physical    Pt Name: Amarilys Fuentes  MRN: [id number]  YOB: 1960  Date of evaluation: 2/23/2023    SUBJECTIVE:   History of Chief Complaint:    Patient presents preprocedure for removal hardware distal radius. She underwent surgery, ORIF distal radius, on 12/26/22. Patient says that she is doing well but the hardware is due to be removed. She works as  and has been off of work, ROM restricted but improved. Past Medical History    has a past medical history of Cervical dysplasia, Factor 5 Leiden mutation, heterozygous ([address] Influenza B, Pneumonia, Shingles, Tricuspid regurgitation, Under care of team, Under care of team, and Wears dentures. Past Surgical History   has a past surgical history that includes Tubal ligation; Knee arthroscopy; Tonsillectomy (1964); pr laparoscopy surg cholecystectomy (N/A, 02/28/2018); Forearm surgery (Left, 12/26/2022); and Forearm surgery (Right, 12/26/2022). Medications  Prior to Admission medications    Medication Sig Start Date End Date Taking? Authorizing Provider   vitamin C (ASCORBIC ACID) 500 MG tablet Take 1 tablet by mouth daily 12/26/22 2/23/23  Kylie Linden,      Allergies  has No Known Allergies. Family History  family history includes Heart Disease in her father; High Blood Pressure in her father. Social History   reports that she has been smoking cigarettes. She has been smoking an average of 1 pack per day. She has never used smokeless tobacco.   reports no history of alcohol use. reports no history of drug use.   Marital Status single  Occupation [employer]    Review of Systems:  CONSTITUTIONAL:   negative for fevers, chills, fatigue and malaise    EYES:   negative for double vision, blurred vision and photophobia    HEENT:   negative for tinnitus, epistaxis and sore throat     RESPIRATORY:   negative for cough, shortness of breath, wheezing     CARDIOVASCULAR:   negative for chest pain, palpitations, syncope, edema GASTROINTESTINAL:   negative for nausea, vomiting     GENITOURINARY:   negative for incontinence     MUSCULOSKELETAL:   See HPI   NEUROLOGICAL:   Negative for weakness and tingling  negative for headaches and dizziness     PSYCHIATRIC:   negative for anxiety         OBJECTIVE:   VITALS:  height is 6' 3\" (1.905 m) and weight is 160 lb (72.6 kg). Her temporal temperature is 96.8 °F (36 °C). Her blood pressure is 154/74 (abnormal) and her pulse is 65. Her respiration is 18 and oxygen saturation is 97%. CONSTITUTIONAL:alert & cooperative, no acute distress. Pleasant and talkative. SKIN:  Warm and dry, no rashes on exposed areas of skin   HEAD:  Normocephalic, atraumatic   EYES: EOMs intact. EARS:  Hearing grossly WNL. NOSE:  Nares patent. No rhinorrhea   MOUTH/THROAT:  benign  NECK:good ROM   LUNGS: expiratory wheezes noted throughout (patient says will have a breathing treatment today)  CARDIOVASCULAR: Heart sounds are normal.  Regular rate and rhythm without murmur. ABDOMEN: soft, non tender, non distended. EXTREMITIES: no edema bilateral lower extremities. Left wrist with decreased ROM. IMPRESSIONS:   Retained hardware distal radius   has a past medical history of Cervical dysplasia, Factor 5 Leiden mutation, heterozygous ([facility] [address] Influenza B (01/16/2018), Pneumonia (01/31/2018), Shingles (11/2021), Tricuspid regurgitation (2018), Under care of team, Under care of team, and Wears dentures.    PLANS:   Removal hardware distal radius    Savage Trujillo PA-C  Electronically signed 2/23/2023 at 7:33 AM

## 2023-02-23 NOTE — BRIEF OP NOTE
Brief Postoperative Note      Patient: Elias Baca  YOB: 1960  MRN: [id number]    Date of Procedure: 2/23/2023    Pre-Op Diagnosis: Retained hardware left radius/metacarpal    Post-Op Diagnosis: Retained hardware left radius/metacarpal       Procedure(s):  left radius/metacarpal deep implant hardware removal  left wrist manipulation under anesthesia  Stress exam left radiocarpal joint with independent interpretation of imaging    Surgeon(s): La Herzog DO    Assistant: Elian Robertson DO    Anesthesia: General    Estimated Blood Loss (mL): 1 mL    Tourniquet time: 22 mins    Complications: None    Specimens: * No specimens in log *    Implants:  Implant Name Type Inv. Item Serial No.  Lot No. LRB No. Used Action   SCREW BNE L14MM DIA2. 4MM S STL ST MECHELLE FULL THRD T8 STARDRV - [id number]  SCREW BNE L14MM DIA2. 4MM S STL ST MECHELLE FULL THRD T8 STARDRV  DEPUY SYNTHES [facility]-WD  Left 1 Explanted   SCREW BNE L16MM DIA2.7MM GALLO S STL ST MECHELLE FULL THRD T8 - [id number]  SCREW BNE L16MM DIA2.7MM GALLO S STL ST MECHELLE FULL THRD T8  DEPUY SYNTHES [employer]-WD  Left 1 Explanted   SCREW BNE L16MM DIA2.7MM GALLO S STL ST T8 STARDRV RECESS - [id number]  SCREW BNE L16MM DIA2.7MM GALLO S STL ST T8 STARDRV RECESS  DEPUY [employer] [place]-WD  Left 2 Explanted   SCREW BNE L18MM DIA2. 4MM S STL ST MECHELLE FULL THRD T8 STARDRV - [id number]  SCREW BNE L18MM DIA2. 4MM S STL ST MECHELLE FULL THRD T8 STARDRV  DEPUY SYNTHES [facility]-WD  Left 1 Explanted   SCREW CORTEX STAR 2.4 X 9MM - [id number]  SCREW BNE L9MM DIA2. 4MM GALLO S STL ST T8 STARDRV RECESS  DEPUY [employer] [place]-WD  Left 1 Explanted   SCREW LK SLFTP W/ T8 2.4X11MM - [id number]  SCREW BNE L11MM DIA2. 4MM S STL ST MECHELLE FULL THRD T8 STARDRV  DEPUY SYNTHES [facility]-WD  Left 1 Explanted   SCREW LK SLFTP W/ T8 2.7X12MM - [id number]  SCREW BNE L12MM DIA2.7MM GALLO S STL ST MECHELLE FULL THRD T8  DEPUY [employer] [place]-WD  Left 1 Explanted   PLATE BNE STR 7.0G407 MM WRST SS STRL LCP - [id number]  PLATE BNE STR 2.8X131 MM WRST SS STRL LCP  [facility]-WD  Left 1 Explanted         Drains: * No LDAs found *    Findings: Retained hardware left radius/metacarpal    Electronically signed by Latrell Miller DO on 2/23/2023 at 9:37 AM

## 2023-02-23 NOTE — ANESTHESIA POSTPROCEDURE EVALUATION
POST- ANESTHESIA EVALUATION       Pt Name: Sejal Amin  MRN: [id number]  YOB: 1960  Date of evaluation: 2/23/2023  Time:  10:35 AM      BP (!) 120/93   Pulse 78   Temp 96.8 °F (36 °C)   Resp 20   Ht 6' 3\" (1.905 m)   Wt 160 lb (72.6 kg)   SpO2 95%   BMI 20.00 kg/m²      Consciousness Level  Awake  Cardiopulmonary Status  Stable  Pain Adequately Treated YES  Nausea / Vomiting  NO  Adequate Hydration  YES  Anesthesia Related Complications NONE      Electronically signed by Vicente Butterfield MD on 2/23/2023 at 10:35 AM       Department of Anesthesiology  Postprocedure Note    Patient: Sejal Amin  MRN: [id number]  YOB: 1960  Date of evaluation: 2/23/2023      Procedure Summary     Date: 02/23/23 Room / Location: [place] [address] / [facility]    Anesthesia Start: 0810 Anesthesia Stop: 0922    Procedure: DISTAL RADIUS HARDWARE REMOVAL  (3080 HAND TABLE, SUPINE, C-ARM) (Left: Wrist) Diagnosis:       History of removal of retained hardware      (RETAINED HARDWARE OF DISTAL RADIUS)    Surgeons: Deshawn Perea DO Responsible Provider: Vicente Butterfield MD    Anesthesia Type: general ASA Status: 2          Anesthesia Type: No value filed.    Killian Phase I: Killian Score: 10    Killian Phase II: Killian Score: 10      Anesthesia Post Evaluation

## 2023-02-23 NOTE — OP NOTE
[facility] [address]                                OPERATIVE REPORT    PATIENT NAME: Sai Newby                     :        1960  MED REC NO:   [id number]                             ROOM:  ACCOUNT NO:   [de-identified]                           ADMIT DATE: 2023  PROVIDER:     Patt Perea    DATE OF PROCEDURE:  2023    PREOPERATIVE DIAGNOSIS:  Retained hardware, left radius/metacarpal.    POSTOPERATIVE DIAGNOSIS:  Retained hardware, left radius/metacarpal.    OPERATION PERFORMED:  1. Left radius/metacarpal deep implant removal.  2.  Left wrist manipulation under anesthesia. 3.  Stress examination, left radiocarpal joint with independent  interpretation of imaging. SURGEON:  Patt Perea D.O.    ASSISTANT:  Len Prince DO, PGY-5. ANESTHESIA:  General.    ESTIMATED BLOOD LOSS:  1cc    TOURNIQUET TIME:  22 minutes. COMPLICATIONS:  None. SPECIMEN:  None. EXPLANTS:  One dorsal-spanning plate and eight screws. FINDINGS:  Stiff left radiocarpal joint with stable radiocarpal joint  and stable fracture left distal radius. INDICATIONS:  This is a 18-year-old female who initially underwent open  reduction and internal fixation of left intra-articular distal radius  fracture on 2022. She had been immobilized for approximately 2  months. She presents today for staged removal of dorsal-spanning plate  with manipulation of wrist and stress examination under anesthesia. Given the length of time, she was immobilized. Consent was obtained and  placed in the chart. All questions were answered appropriately.    Surgical risks including but not limited to bleeding, blood clots,  infection, damage to nearby tissues, vessels and nerves, would healing  complications, failed procedure, stiffness, loss of motion, hardware  failure, hardware irritation, patient dissatisfaction, anesthesia risk,  loss of limb, loss of life were all discussed with the patient. Knowing  this, the patient wished proceed with surgery. OPERATIVE PROCEDURE:  The patient was taken to the operative suite,  placed on general anesthesia without any complications. Ancef 2 gm were  given prior to incision. At this time, all team members paused to for  proper patient name, indications and allergies. All team members in  agreeance. Left upper extremity was prepped and draped in normal  sterile fashion. Using a prior scar, we marked our incision. As  previously stated, dorsal-spanning plate was placed. Given the  comminuted intra-articular nature of the fracture and being very close  to the joint, reconstruction of the joint was not amenable, so this was  part of a staged procedure to planned removal of her deep implant. We  then incised the skin directly over the second metacarpal and the radius  over the prior scars. Meticulous dissection was carried down through  the skin and subcutaneous tissues. Tendons were retracted out of the  way to visualize the plate and screws. The plate and screws were  removed without complication after sliding the plate distally through  the distal incision. Being satisfied, we used intraoperative  fluoroscopy. We initially stressed the fracture site and it was deemed  stable. There was no instability noted. No fracture fragments moving. This was done on both AP and lateral radiographs under live fluoroscopy. Then, we stressed the radiocarpal joint. There was no instability  noted. There was no subluxation. It was deemed stable as well. We  then manipulated the wrist, as she has been immobilized for a period of  2 months, it was very stiff. There was palpable release of adhesions. She got proximally 75 degrees of flexion and extension after the  manipulation. Being satisfied, final fluoroscopic images were taken.    We thoroughly irrigated the wound with normal saline. We closed the  deep dermal layer using 2-0 Monocryl and the skin using 4-0 Monocryl in  a running subcutaneous tissue fashion. Dermabond glue was then applied. The wound was then dressed using fluffs, ABD, Webril and Ace wrap was  applied. The patient was placed in a removable wrist brace. The  patient was then awoken from general anesthesia, transferred to PACU in  stable condition. I was present for all aspects of procedure. Meticulous dissection was used and thorough hemostasis was achieved. The patient will be activities as tolerated in the left upper extremity,  and follow up in my clinic in 2 weeks. A tourniquet was used for a  total of 22 minutes, at which time, it was taken down at the conclusion  of the case without complication.         Isael Drake    D: 02/23/2023 9:55:46       T: 02/23/2023 9:59:28     [email or web address]  Job#: [id number]     Doc#: [id number]    CC:

## 2023-03-02 DIAGNOSIS — S52.502D CLOSED FRACTURE OF DISTAL END OF LEFT RADIUS WITH ROUTINE HEALING, UNSPECIFIED FRACTURE MORPHOLOGY, SUBSEQUENT ENCOUNTER: Primary | ICD-10-CM

## 2023-03-20 ENCOUNTER — OFFICE VISIT (OUTPATIENT)
Dept: ORTHOPEDIC SURGERY | Age: 63
End: 2023-03-20

## 2023-03-20 VITALS — BODY MASS INDEX: 21.67 KG/M2 | HEIGHT: 72 IN | WEIGHT: 160 LBS

## 2023-03-20 DIAGNOSIS — S52.501D CLOSED FRACTURE OF DISTAL END OF RIGHT RADIUS WITH ROUTINE HEALING, UNSPECIFIED FRACTURE MORPHOLOGY, SUBSEQUENT ENCOUNTER: ICD-10-CM

## 2023-03-20 DIAGNOSIS — S52.502D CLOSED FRACTURE OF DISTAL END OF LEFT RADIUS WITH ROUTINE HEALING, UNSPECIFIED FRACTURE MORPHOLOGY, SUBSEQUENT ENCOUNTER: Primary | ICD-10-CM

## 2023-03-20 PROCEDURE — 99024 POSTOP FOLLOW-UP VISIT: CPT | Performed by: STUDENT IN AN ORGANIZED HEALTH CARE EDUCATION/TRAINING PROGRAM

## 2023-03-20 NOTE — PROGRESS NOTES
clinic  Neuro: alert. oriented  Eyes: Extra-ocular muscles intact  Pulm: Respirations unlabored and regular. Skin: warm, well perfused  Psych:   Patient has good fund of knowledge and displays understanging of exam, diagnosis, and plan. MSK:    Left upper extremity: Incisions healing well without wound complication. No erythema, drainage, dehiscence, purulence, or overt sign of infection. Mild discomfort with palpation at the distal radius. Wrist range of motion is 10 degrees wrist extension, 35 degrees flexion. Full extension of the fingers. 3 cm from pulp to palm of limited finger flexion. Compartments soft. Some dysesthesias around the dorsal hand incision otherwise, Med/Rad/Ulnar/AIN/PIN motor intact. Axil/MSC/Med/Rad/Ulnar n sensation intact to light touch. Radial pulse 2+. Right upper extremity: No abrasion, laceration, or deformity noted. Nonpainful to palpation over the distal radius. Able to dorsiflex the wrist to 20 degrees. Wrist flexion to 50 degrees. Full finger flexion and extension. Compartments soft. Med/Rad/Ulnar/AIN/PIN motor intact. Axil/MSC/Med/Rad/Ulnar n sensation intact to light touch. Radial pulse 2+. Radiology:  History: Left distal radius fracture    Comparison: Films from 2/23/2023, 2/15/2023    Findings: Films with 3 views of the left wrist (AP, lateral, oblique) is skeletally mature individual re-demonstrating left distal radius fracture with sclerosis and callus at the fracture site. Fracture lines less visible than previous films. There is residual holes in the radial shaft and second metacarpal from previous orthopedic hardware. No changes in alignment, or angulation from previous films. Appears to have maintained slight volar angulation on lateral.  There is significant grade 4 arthritic changes of the first CMC joint. No other obvious fractures, dislocations, or subluxations noted. No lytic or blastic lesions noted.     Impression: Redemonstration of left

## 2023-03-20 NOTE — LETTER
March 20, 2023       Karen Reed YOB: 1960   [address] 82 Date of Visit:  3/20/2023       To Whom It May Concern: It is my medical opinion that Karen Reed is unable to perform dexterity of the left hand and has a left upper extremity 20 pound lifting restriction until 4/24/2023. If you have any questions or concerns, please don't hesitate to call.     Sincerely,        Renea Sheriff, DO

## 2023-05-03 ENCOUNTER — OFFICE VISIT (OUTPATIENT)
Dept: ORTHOPEDIC SURGERY | Age: 63
End: 2023-05-03

## 2023-05-03 VITALS — BODY MASS INDEX: 21.67 KG/M2 | WEIGHT: 160 LBS | HEIGHT: 72 IN

## 2023-05-03 DIAGNOSIS — S52.502D CLOSED FRACTURE OF DISTAL END OF LEFT RADIUS WITH ROUTINE HEALING, UNSPECIFIED FRACTURE MORPHOLOGY, SUBSEQUENT ENCOUNTER: ICD-10-CM

## 2023-05-03 DIAGNOSIS — S52.502A CLOSED FRACTURE OF DISTAL END OF LEFT RADIUS, UNSPECIFIED FRACTURE MORPHOLOGY, INITIAL ENCOUNTER: Primary | ICD-10-CM

## 2023-05-03 DIAGNOSIS — S52.501D CLOSED FRACTURE OF DISTAL END OF RIGHT RADIUS WITH ROUTINE HEALING, UNSPECIFIED FRACTURE MORPHOLOGY, SUBSEQUENT ENCOUNTER: ICD-10-CM

## 2023-05-06 NOTE — PROGRESS NOTES
[facility]  [address] [facility]  Dept Phone: [phone]  Dept Fax: [phone]      Orthopaedic Trauma Clinic Follow Up      Subjective:   Date of Surgery:     12/26/2022: Left wrist dorsal spanning plate  6/98/4622: Left wrist dorsal spanning plate hardware removal, and left wrist manipulation under anesthesia, and left wrist stress exam under anesthesia     Other injuries: Right distal radius fracture treated non operatively    Karen Reed is a 60y.o. year old female who presents to the clinic today for follow up status post above listed procedures. Patient presents to clinic today with her , as well as her  for her Worker's Comp. complaint. Patient has been attending physical therapy, and still feels as if she is progressing well. Denies any new injuries or falls. Patient is inquiring about when she should return to work. 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 Patient still unable to  effectively with her left upper extremity. Denies any numbness or tingling. Patient thinks that she would still benefit from some ongoing therapy prior to returning to work. Review of Systems  Gen: no fever, chills, malaise  CV: no chest pain or palpitations  Resp: no cough or shortness of breath  GI: no nausea, vomiting, diarrhea, or constipation  Neuro: no seizures, vertigo, or headache  Msk: Per HPI  10 remaining systems reviewed and negative    Objective : There were no vitals filed for this visit. Body mass index is 20 kg/m². General: No acute distress, resting comfortably in the clinic  Neuro: alert. oriented  Eyes: Extra-ocular muscles intact  Pulm: Respirations unlabored and regular.   Skin: warm, well perfused  Psych:   Patient has good fund of knowledge and displays understanding of exam, diagnosis,

## 2023-07-17 ENCOUNTER — OFFICE VISIT (OUTPATIENT)
Dept: ORTHOPEDIC SURGERY | Age: 63
End: 2023-07-17

## 2023-07-17 VITALS — HEIGHT: 72 IN | BODY MASS INDEX: 21.67 KG/M2 | WEIGHT: 160 LBS

## 2023-07-17 DIAGNOSIS — S52.502D CLOSED FRACTURE OF DISTAL END OF LEFT RADIUS WITH ROUTINE HEALING, UNSPECIFIED FRACTURE MORPHOLOGY, SUBSEQUENT ENCOUNTER: Primary | ICD-10-CM

## 2023-07-17 DIAGNOSIS — S52.501D CLOSED FRACTURE OF DISTAL END OF RIGHT RADIUS WITH ROUTINE HEALING, UNSPECIFIED FRACTURE MORPHOLOGY, SUBSEQUENT ENCOUNTER: ICD-10-CM

## 2023-09-01 ENCOUNTER — TELEPHONE (OUTPATIENT)
Dept: ORTHOPEDIC SURGERY | Age: 63
End: 2023-09-01

## 2023-09-01 NOTE — TELEPHONE ENCOUNTER
Patient called stating she received a letter from [facility] that she is to be seen by Dr Stanislaw Ariza for a follow up regarding her claim and her light duty status. She needs to be seen by 9/14 and his schedule is full. Please return her call to discuss. Thank you.

## 2023-09-06 ENCOUNTER — OFFICE VISIT (OUTPATIENT)
Dept: ORTHOPEDIC SURGERY | Age: 63
End: 2023-09-06

## 2023-09-06 VITALS — WEIGHT: 160 LBS | BODY MASS INDEX: 21.67 KG/M2 | HEIGHT: 72 IN

## 2023-09-06 DIAGNOSIS — S52.502D CLOSED FRACTURE OF DISTAL END OF LEFT RADIUS WITH ROUTINE HEALING, UNSPECIFIED FRACTURE MORPHOLOGY, SUBSEQUENT ENCOUNTER: Primary | ICD-10-CM

## 2023-09-06 DIAGNOSIS — S52.501D CLOSED FRACTURE OF DISTAL END OF RIGHT RADIUS WITH ROUTINE HEALING, UNSPECIFIED FRACTURE MORPHOLOGY, SUBSEQUENT ENCOUNTER: ICD-10-CM

## 2023-11-18 ENCOUNTER — HOSPITAL ENCOUNTER (EMERGENCY)
Facility: CLINIC | Age: 63
Discharge: HOME | End: 2023-11-18
Attending: EMERGENCY MEDICINE
Payer: COMMERCIAL

## 2023-11-18 ENCOUNTER — APPOINTMENT (OUTPATIENT)
Dept: GENERAL RADIOLOGY | Facility: CLINIC | Age: 63
End: 2023-11-18
Payer: COMMERCIAL

## 2023-11-18 VITALS
DIASTOLIC BLOOD PRESSURE: 92 MMHG | HEIGHT: 72 IN | HEART RATE: 72 BPM | BODY MASS INDEX: 21.67 KG/M2 | SYSTOLIC BLOOD PRESSURE: 157 MMHG | WEIGHT: 160 LBS | OXYGEN SATURATION: 97 % | TEMPERATURE: 97.9 F | RESPIRATION RATE: 14 BRPM

## 2023-11-18 DIAGNOSIS — J06.9 URI WITH COUGH AND CONGESTION: Primary | ICD-10-CM

## 2023-11-18 LAB
FLUAV AG SPEC QL: NEGATIVE
FLUBV AG SPEC QL: NEGATIVE
SARS-COV-2 RDRP RESP QL NAA+PROBE: NOT DETECTED
SPECIMEN DESCRIPTION: NORMAL

## 2023-11-18 PROCEDURE — 71046 X-RAY EXAM CHEST 2 VIEWS: CPT

## 2023-11-18 PROCEDURE — 87635 SARS-COV-2 COVID-19 AMP PRB: CPT

## 2023-11-18 PROCEDURE — 99284 EMERGENCY DEPT VISIT MOD MDM: CPT

## 2023-11-18 PROCEDURE — 87804 INFLUENZA ASSAY W/OPTIC: CPT

## 2023-11-18 RX ORDER — BENZONATATE 100 MG/1
100 CAPSULE ORAL 3 TIMES DAILY PRN
Qty: 30 CAPSULE | Refills: 0 | Status: SHIPPED | OUTPATIENT
Start: 2023-11-18 | End: 2023-11-18 | Stop reason: SDUPTHER

## 2023-11-18 RX ORDER — LORATADINE 10 MG
1 CAPSULE ORAL EVERY 4 HOURS PRN
Qty: 30 CAPSULE | Refills: 0 | Status: SHIPPED | OUTPATIENT
Start: 2023-11-18 | End: 2023-11-18 | Stop reason: SDUPTHER

## 2023-11-18 RX ORDER — BENZONATATE 100 MG/1
100 CAPSULE ORAL 3 TIMES DAILY PRN
Qty: 30 CAPSULE | Refills: 0 | Status: SHIPPED | OUTPATIENT
Start: 2023-11-18 | End: 2023-11-28

## 2023-11-18 RX ORDER — LORATADINE 10 MG
1 CAPSULE ORAL EVERY 4 HOURS PRN
Qty: 30 CAPSULE | Refills: 0 | Status: SHIPPED | OUTPATIENT
Start: 2023-11-18

## 2023-11-18 ASSESSMENT — PAIN - FUNCTIONAL ASSESSMENT: PAIN_FUNCTIONAL_ASSESSMENT: NONE - DENIES PAIN

## 2023-11-18 NOTE — ED PROVIDER NOTES
Octaviano Sabinoannatanoel      Pt Name: Madelyn Kidd  MRN: [id number]  [address] [place] 1960  Date of evaluation: 11/18/2023      CHIEF COMPLAINT       Chief Complaint   Patient presents with    Cough    Fever    Hoarse     Patient states she has had cold symptoms for 1 week. States her  was dx with covid on Thursday. HISTORY OF PRESENT ILLNESS      The patient presents with a cough. She has had fever and hoarseness as well. The symptoms have been going on for about a week. She says her  was diagnosed with bronchitis. She is a smoker. She does not have a history of asthma. She denies hemoptysis. She has had mild nasal congestion. She has had a little diarrhea as well. Nothing makes her symptoms better or worse otherwise. REVIEW OF SYSTEMS       All systems reviewed and negative unless noted in HPI. The patient reports a fever. Denies vision change. Sore throat and raspy voice for a week. Denies any neck pain or stiffness. Denies chest pain or shortness of breath. Occasionally productive cough. Patient is a smoker. Reports diarrhea without vomiting. Denies any dysuria. Denies urinary frequency or hematuria. Denies musculoskeletal injury or pain. Denies any weakness, numbness or focal neurologic deficit. Denies any skin rash or edema. No recent psychiatric issues. No easy bruising or bleeding. Denies any polyuria, polydypsia or history of immunocompromise. PAST MEDICAL HISTORY    has a past medical history of Cervical dysplasia, Factor 5 Leiden mutation, heterozygous ([address]), Influenza B, Pneumonia, Shingles, Tricuspid regurgitation, Under care of team, Under care of team, and Wears dentures. SURGICAL HISTORY      has a past surgical history that includes Tubal ligation; Knee arthroscopy; Tonsillectomy (1964); pr laparoscopy surg cholecystectomy (N/A, 02/28/2018);  Forearm surgery (Left, 12/26/2022);

## 2023-11-18 NOTE — DISCHARGE INSTRUCTIONS
immediately. Tell us how we did during your visit at [email or web address]   and let us know about your experience

## 2024-01-26 ENCOUNTER — OFFICE VISIT (OUTPATIENT)
Dept: ORTHOPEDIC SURGERY | Age: 64
End: 2024-01-26

## 2024-01-26 VITALS — HEIGHT: 72 IN | BODY MASS INDEX: 20 KG/M2

## 2024-01-26 DIAGNOSIS — S52.502A CLOSED FRACTURE OF DISTAL END OF LEFT RADIUS, UNSPECIFIED FRACTURE MORPHOLOGY, INITIAL ENCOUNTER: ICD-10-CM

## 2024-01-26 DIAGNOSIS — S52.501A CLOSED FRACTURE OF DISTAL END OF RIGHT RADIUS, UNSPECIFIED FRACTURE MORPHOLOGY, INITIAL ENCOUNTER: Primary | ICD-10-CM

## 2024-01-26 NOTE — PROGRESS NOTES
anesthesia      Other injuries:   Right distal radius fracture treated non operatively  Plan:   Lengthy discussion had with patient about current clinical state.  Activities as tolerated bilateral upper extremities.  5 pound weight restriction should be maintained while at work.  Although patient has made improvements with her strength with her left upper extremity, I feel it is still not safe for patient to return to full duty given her residual weakness, and loss of muscle endurance.  Continue home therapy exercises and working on bands and strengthening.  I discussed that we will see patient back in 6-month for reevaluation and possible return with fewer restrictions.  Patient expressed understanding.  Paperwork filled out for her job.  All questions answered.  Patient is amenable to this plan        Electronically signed by Deshawn Perea DO on 1/26/2024 at 10:41 AM    This note is created with the assistance of a speech recognition program.  While intending to generate a document that actually reflects the content of the visit, the document can still have some errors including those of syntax and sound a like substitutions which may escape proof reading.  In such instances, actual meaning can be extrapolated by contextual diversion

## 2024-07-26 ENCOUNTER — OFFICE VISIT (OUTPATIENT)
Dept: ORTHOPEDIC SURGERY | Age: 64
End: 2024-07-26

## 2024-07-26 VITALS — HEIGHT: 72 IN | BODY MASS INDEX: 21.67 KG/M2 | WEIGHT: 160 LBS

## 2024-07-26 DIAGNOSIS — S52.501D CLOSED FRACTURE OF DISTAL END OF RIGHT RADIUS WITH ROUTINE HEALING, UNSPECIFIED FRACTURE MORPHOLOGY, SUBSEQUENT ENCOUNTER: ICD-10-CM

## 2024-07-26 DIAGNOSIS — S52.502D CLOSED FRACTURE OF DISTAL END OF LEFT RADIUS WITH ROUTINE HEALING, UNSPECIFIED FRACTURE MORPHOLOGY, SUBSEQUENT ENCOUNTER: Primary | ICD-10-CM

## 2024-07-26 NOTE — PROGRESS NOTES
[facility]  [address]  [place] [place] [address]  Dept Phone: [phone]  Dept Fax: [phone]      Orthopaedic Trauma Clinic Follow Up      Subjective:   Date of Surgery:   12/26/2022:   Left wrist dorsal spanning plate      2/23/2023:   Left wrist dorsal spanning plate hardware removal, and left wrist manipulation under anesthesia, and left wrist stress exam under anesthesia      Other injuries:   Right distal radius fracture treated non operatively    Sejal Amin is a 63 y.o. year old female who presents to the clinic today for routine follow up 6 months after previous clinic visit. Patient still continues to complain of left wrist weakness and endurance, and is still having difficulties completing tasks at work. She states that she after a long day of work she develops pain at the base of her 4th and 5th MCP and 1st web space. 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 Otherwise she's doing very well.    Patient recently had secondary medical evaluator examination performed in June, that stated secondary to her left upper extremity, patient should be limited to 15 pounds with maximum 2 hours of use at that weight.    Review of Systems  Gen: no fever, chills, malaise  CV: no chest pain or palpitations  Resp: no cough or shortness of breath  GI: no nausea, vomiting, diarrhea, or constipation  Neuro: no seizures, vertigo, or headache  Msk: Per HPI  10 remaining systems reviewed and negative    Objective :   There were no vitals filed for this visit.Body mass index is 20 kg/m².  General: No acute distress, resting comfortably in the clinic  Neuro: alert. oriented  Eyes: Extra-ocular muscles intact  Pulm: Respirations unlabored and regular.  Skin: warm, well perfused  Psych:   Patient has good fund of knowledge

## 2025-01-20 ENCOUNTER — OFFICE VISIT (OUTPATIENT)
Dept: ORTHOPEDIC SURGERY | Age: 65
End: 2025-01-20

## 2025-01-20 VITALS — WEIGHT: 175 LBS | BODY MASS INDEX: 23.7 KG/M2 | HEIGHT: 72 IN

## 2025-01-20 DIAGNOSIS — S52.501D CLOSED FRACTURE OF DISTAL END OF RIGHT RADIUS WITH ROUTINE HEALING, UNSPECIFIED FRACTURE MORPHOLOGY, SUBSEQUENT ENCOUNTER: ICD-10-CM

## 2025-01-20 DIAGNOSIS — S52.502D CLOSED FRACTURE OF DISTAL END OF LEFT RADIUS WITH ROUTINE HEALING, UNSPECIFIED FRACTURE MORPHOLOGY, SUBSEQUENT ENCOUNTER: Primary | ICD-10-CM

## 2025-01-20 NOTE — PROGRESS NOTES
[facility]  [address]  [place] [address]  Dept: [phone]  Dept Fax: [phone]        Orthopaedic Trauma Clinic Follow Up      Subjective:   Date of Surgery:   12/26/2022:   Left wrist dorsal spanning plate      2/23/2023:   Left wrist dorsal spanning plate hardware removal, and left wrist manipulation under anesthesia, and left wrist stress exam under anesthesia      Other injuries:   Right distal radius fracture treated non operatively    Sejal Amin is a 64 y.o. year old female who presents to the clinic today for routine followup regarding the above injuries.  She is now over 1 year and 11 months from her left wrist dorsal spanning plate removal with manipulation under anesthesia.  Patient has worked diligently with her home exercises and has continued to work in the office with [employer].  She states that over long days of work her hand does still feel fatigued and she has difficulty with fine dexterity exercises.  She states that she does not think that she has made improvements since her last visit and is sad that she is no longer able to fulfill the requirements of her prior job.  She has learned to adapt to her new position with [employer].  She denies any interval history, trauma or other events.  Patient states that her pain has been controlled and she only has 1 small area of numbness over the dorsal portion of her left hand.  She has no other orthopedic complaint or concern today.      Review of Systems  Gen: no fever, chills, malaise  CV: no chest pain or palpitations  Resp: no cough or shortness of breath  GI: no nausea, vomiting, diarrhea, or constipation  Neuro: no numbness, tingling, or weakness  Msk: See HPI  10 remaining systems reviewed and negative    Objective :   There were no vitals filed for this visit.Body mass index is 21.73 kg/m².  General: No acute distress, resting comfortably

## 2025-01-30 ENCOUNTER — TELEPHONE (OUTPATIENT)
Dept: ORTHOPEDIC SURGERY | Age: 65
End: 2025-01-30

## 2025-01-30 NOTE — TELEPHONE ENCOUNTER
Pt called in stating her [employer] paper work did not include weight bearing status or pts time off work. Pt stated staff was notified about this last week and she requesting an update

## 2025-01-30 NOTE — TELEPHONE ENCOUNTER
Patient called the office requesting adt'l restrictions for return to work. According to last office visit: 15lb max lifting to LUE; MARY CARMENE cleared without restriction.   These restrictions were sent to [employer]. Patient wants to have restrictions on hours per day as she does not want to \"go back out on the streets.\"

## (undated) DEVICE — YANKAUER,FLEXIBLE HANDLE,REGLR CAPACITY: Brand: MEDLINE INDUSTRIES, INC.

## (undated) DEVICE — CYSTO/BLADDER IRRIGATION SET, REGULATING CLAMP

## (undated) DEVICE — DRAPE,U/ SHT,SPLIT,PLAS,STERIL: Brand: MEDLINE

## (undated) DEVICE — DRESSING TRNSPAR W5XL4.5IN FLM SHT SEMIPERMEABLE WIND

## (undated) DEVICE — GLOVE SURG SZ 65 L12IN FNGR THK87MIL WHT LTX FREE

## (undated) DEVICE — ADHESIVE SKIN CLOSURE TOP 36 CC HI VISC DERMBND MINI

## (undated) DEVICE — SCREW BNE L16MM DIA2.7MM CORT S STL ST LOK FULL THRD T8: Type: IMPLANTABLE DEVICE | Site: ARM | Status: NON-FUNCTIONAL

## (undated) DEVICE — BIT DRL L140MM DIA2MM QUIK CPL 3 FLUT CALIB DEPTH MRK W/O

## (undated) DEVICE — CUFF REPROC TRNQT DPSB W/PLC RED 18IN

## (undated) DEVICE — DISCONTINUED USE 405792 GLOVE SURG SENSICARE ALOE LT LF PF ST GRN SZ 7

## (undated) DEVICE — GLOVE SURG SZ 65 THK91MIL LTX FREE SYN POLYISOPRENE

## (undated) DEVICE — SUTURE ETHLN SZ 2-0 L18IN NONABSORBABLE BLK L26MM PS 3/8 585H

## (undated) DEVICE — SVMMC ORTH SPL DRP PK

## (undated) DEVICE — DRAPE,BAR,HEAD,STERILE: Brand: MEDLINE

## (undated) DEVICE — BANDAGE,GAUZE,BULKEE II,4.5"X4.1YD,STRL: Brand: MEDLINE

## (undated) DEVICE — SUTURE MCRYL SZ 4-0 L18IN ABSRB UD L19MM PS-2 3/8 CIR PRIM Y496G

## (undated) DEVICE — BANDAGE COMPR W6INXL12FT SMOOTH FOR LIMB EXSANG ESMARCH

## (undated) DEVICE — GLOVE ORANGE PI 7 1/2   MSG9075

## (undated) DEVICE — C-ARM: Brand: UNBRANDED

## (undated) DEVICE — DRESSING,GAUZE,XEROFORM,CURAD,1"X8",ST: Brand: CURAD

## (undated) DEVICE — TUBING SUCT 12FR MAL ALUM SHFT FN CAP VENT UNIV CONN W/ OBT

## (undated) DEVICE — POSITIONER HD W8XH4XL8.5IN RASPBERRY FOAM SLT

## (undated) DEVICE — OBTURATOR ROBOTIC DIA8MM BLDELSS ENDOSCP DISP DA VINCI SI

## (undated) DEVICE — GARMENT COMPR STD FOR 17IN CALF UNIF THER FLOTRN

## (undated) DEVICE — APPLICATOR MEDICATED 26 CC SOLUTION HI LT ORNG CHLORAPREP

## (undated) DEVICE — GARMENT,MEDLINE,DVT,INT,CALF,MED, GEN2: Brand: MEDLINE

## (undated) DEVICE — PADDING,UNDERCAST,COTTON, 4"X4YD STERILE: Brand: MEDLINE

## (undated) DEVICE — INTENDED FOR TISSUE SEPARATION, AND OTHER PROCEDURES THAT REQUIRE A SHARP SURGICAL BLADE TO PUNCTURE OR CUT.: Brand: BARD-PARKER ® CARBON RIB-BACK BLADES

## (undated) DEVICE — SUTURE MCRYL SZ 4-0 L18IN ABSRB UD L16MM PC-3 3/8 CIR PRIM Y845G

## (undated) DEVICE — THE STERILE LIGHT HANDLE COVER IS USED WITH STERIS SURGICAL LIGHTING AND VISUALIZATION SYSTEMS.

## (undated) DEVICE — GLOVE SURG SZ 8 L12IN FNGR THK87MIL WHT LTX FREE

## (undated) DEVICE — TROCARS: Brand: KII® BALLOON BLUNT TIP SYSTEM

## (undated) DEVICE — Z DUP USE 2641840 CLIP INT L POLYMER LOK LIG HEM O LOK

## (undated) DEVICE — INSUFFLATION NEEDLE TO ESTABLISH PNEUMOPERITONEUM.: Brand: INSUFFLATION NEEDLE

## (undated) DEVICE — ST. ANNE'S MULTI PORT PACK: Brand: MEDLINE INDUSTRIES, INC.

## (undated) DEVICE — K WIRE FIX L150MM DIA1.25MM S STL TRCR PNT
Type: IMPLANTABLE DEVICE | Site: ARM | Status: NON-FUNCTIONAL
Removed: 2022-12-26

## (undated) DEVICE — K WIRE FIX L150MM DIA1.6MM S STL TRCR PNT
Type: IMPLANTABLE DEVICE | Site: ARM | Status: NON-FUNCTIONAL
Removed: 2022-12-26

## (undated) DEVICE — GLOVE ORANGE PI 8 1/2   MSG9085

## (undated) DEVICE — APPLICATOR MEDICATED 10.5 CC SOLUTION HI LT ORNG CHLORAPREP

## (undated) DEVICE — KIT DRP 4 ARM ACC DISP DA VINCI SI ENDOWRIST

## (undated) DEVICE — SINGLE USE MONOPOLAR STRAIGHT ENDOSCOPIC CORD 10 FT. (3M): Brand: KIRWAN

## (undated) DEVICE — BIT DRL L110MM DIA1.8MM QUIK CPL CALIB W/O STP REUSE

## (undated) DEVICE — SUTURE PDS II SZ 0 L27IN ABSRB VLT L36MM CT-1 1/2 CIR Z340H

## (undated) DEVICE — TUBING, SUCTION, 1/4" X 12', STRAIGHT: Brand: MEDLINE

## (undated) DEVICE — GLOVE SURG SZ 75 L12IN FNGR THK87MIL WHT LTX FREE

## (undated) DEVICE — GLOVE ORANGE PI 8   MSG9080

## (undated) DEVICE — GOWN,AURORA,NONREINFORCED,LARGE: Brand: MEDLINE

## (undated) DEVICE — BNDG,ELSTC,MATRIX,STRL,4"X5YD,LF,HOOK&LP: Brand: MEDLINE

## (undated) DEVICE — SKIN AFFIX SURG ADHESIVE 72/CS 0.55ML: Brand: MEDLINE

## (undated) DEVICE — SUTURE MCRYL SZ 3-0 L27IN ABSRB UD L24MM PS-1 3/8 CIR PRIM Y936H

## (undated) DEVICE — PADDING,UNDERCAST,COTTON, 3X4YD STERILE: Brand: MEDLINE

## (undated) DEVICE — C-ARMOR C-ARM EQUIPMENT COVERS CLEAR STERILE UNIVERSAL FIT 12 PER CASE: Brand: C-ARMOR

## (undated) DEVICE — PLUMEPORT LAPAROSCOPIC SMOKE FILTRATION DEVICE: Brand: PLUMEPORT ACTIV

## (undated) DEVICE — PAD OVERLAY FOAM 33X72X2

## (undated) DEVICE — DUAL LUMEN STOMACH TUBE: Brand: SALEM SUMP

## (undated) DEVICE — BAG SPEC LAP H6IN DIA3IN 250ML 10 12MM CANN ATTCH MEM WIRE

## (undated) DEVICE — SURGICAL SUCTION CONNECTING TUBE WITH MALE CONNECTOR AND SUCTION CLAMP, 2 FT. LONG (.6 M), 5 MM I.D.: Brand: CONMED

## (undated) DEVICE — SUTURE MCRYL SZ 2-0 L27IN ABSRB UD SH L26MM TAPERPOINT NDL Y417H

## (undated) DEVICE — CORD,CAUTERY,BIPOLAR,STERILE: Brand: MEDLINE

## (undated) DEVICE — KIT,ANTI FOG,W/SPONGE & FLUID,SOFT PACK: Brand: MEDLINE

## (undated) DEVICE — 3M™ WARMING BLANKET, UPPER BODY, 10 PER CASE, 42268: Brand: BAIR HUGGER™

## (undated) DEVICE — ELECTRODE PT RET AD L9FT HI MOIST COND ADH HYDRGEL CORDED